# Patient Record
Sex: FEMALE | Race: WHITE | NOT HISPANIC OR LATINO | Employment: OTHER | URBAN - METROPOLITAN AREA
[De-identification: names, ages, dates, MRNs, and addresses within clinical notes are randomized per-mention and may not be internally consistent; named-entity substitution may affect disease eponyms.]

---

## 2023-02-20 ENCOUNTER — HOSPITAL ENCOUNTER (EMERGENCY)
Facility: HOSPITAL | Age: 83
Discharge: HOME | End: 2023-02-20
Attending: EMERGENCY MEDICINE | Admitting: EMERGENCY MEDICINE
Payer: MEDICARE

## 2023-02-20 ENCOUNTER — APPOINTMENT (OUTPATIENT)
Dept: CT IMAGING | Facility: HOSPITAL | Age: 83
End: 2023-02-20
Payer: MEDICARE

## 2023-02-20 VITALS
TEMPERATURE: 99 F | BODY MASS INDEX: 31.65 KG/M2 | HEIGHT: 65 IN | DIASTOLIC BLOOD PRESSURE: 80 MMHG | OXYGEN SATURATION: 98 % | RESPIRATION RATE: 16 BRPM | HEART RATE: 61 BPM | SYSTOLIC BLOOD PRESSURE: 145 MMHG | WEIGHT: 190 LBS

## 2023-02-20 DIAGNOSIS — N39.0 ACUTE URINARY TRACT INFECTION: ICD-10-CM

## 2023-02-20 DIAGNOSIS — K57.32 DIVERTICULITIS OF SIGMOID COLON: Primary | ICD-10-CM

## 2023-02-20 LAB
ALBUMIN SERPL-MCNC: 3.9 G/DL (ref 3.5–5.2)
ALBUMIN/GLOB SERPL: 1 G/DL
ALP SERPL-CCNC: 97 U/L (ref 39–117)
ALT SERPL W P-5'-P-CCNC: 8 U/L (ref 1–33)
ANION GAP SERPL CALCULATED.3IONS-SCNC: 9 MMOL/L (ref 5–15)
AST SERPL-CCNC: 14 U/L (ref 1–32)
BACTERIA UR QL AUTO: ABNORMAL /HPF
BASOPHILS # BLD AUTO: 0.04 10*3/MM3 (ref 0–0.2)
BASOPHILS NFR BLD AUTO: 0.6 % (ref 0–1.5)
BILIRUB SERPL-MCNC: 1.2 MG/DL (ref 0–1.2)
BILIRUB UR QL STRIP: NEGATIVE
BUN SERPL-MCNC: 15 MG/DL (ref 8–23)
BUN/CREAT SERPL: 14.4 (ref 7–25)
CALCIUM SPEC-SCNC: 10.1 MG/DL (ref 8.6–10.5)
CHLORIDE SERPL-SCNC: 102 MMOL/L (ref 98–107)
CLARITY UR: ABNORMAL
CO2 SERPL-SCNC: 26 MMOL/L (ref 22–29)
COLOR UR: YELLOW
CREAT SERPL-MCNC: 1.04 MG/DL (ref 0.57–1)
D-LACTATE SERPL-SCNC: 1.1 MMOL/L (ref 0.5–2)
DEPRECATED RDW RBC AUTO: 44.6 FL (ref 37–54)
EGFRCR SERPLBLD CKD-EPI 2021: 53.1 ML/MIN/1.73
EOSINOPHIL # BLD AUTO: 0.14 10*3/MM3 (ref 0–0.4)
EOSINOPHIL NFR BLD AUTO: 2.1 % (ref 0.3–6.2)
ERYTHROCYTE [DISTWIDTH] IN BLOOD BY AUTOMATED COUNT: 14.5 % (ref 12.3–15.4)
GLOBULIN UR ELPH-MCNC: 3.8 GM/DL
GLUCOSE SERPL-MCNC: 81 MG/DL (ref 65–99)
GLUCOSE UR STRIP-MCNC: NEGATIVE MG/DL
HCT VFR BLD AUTO: 40.5 % (ref 34–46.6)
HGB BLD-MCNC: 12.7 G/DL (ref 12–15.9)
HGB UR QL STRIP.AUTO: NEGATIVE
HOLD SPECIMEN: NORMAL
HOLD SPECIMEN: NORMAL
HYALINE CASTS UR QL AUTO: ABNORMAL /LPF
IMM GRANULOCYTES # BLD AUTO: 0.03 10*3/MM3 (ref 0–0.05)
IMM GRANULOCYTES NFR BLD AUTO: 0.4 % (ref 0–0.5)
KETONES UR QL STRIP: ABNORMAL
LEUKOCYTE ESTERASE UR QL STRIP.AUTO: ABNORMAL
LIPASE SERPL-CCNC: 9 U/L (ref 13–60)
LYMPHOCYTES # BLD AUTO: 1.25 10*3/MM3 (ref 0.7–3.1)
LYMPHOCYTES NFR BLD AUTO: 18.6 % (ref 19.6–45.3)
MCH RBC QN AUTO: 26.6 PG (ref 26.6–33)
MCHC RBC AUTO-ENTMCNC: 31.4 G/DL (ref 31.5–35.7)
MCV RBC AUTO: 84.7 FL (ref 79–97)
MONOCYTES # BLD AUTO: 0.54 10*3/MM3 (ref 0.1–0.9)
MONOCYTES NFR BLD AUTO: 8 % (ref 5–12)
NEUTROPHILS NFR BLD AUTO: 4.72 10*3/MM3 (ref 1.7–7)
NEUTROPHILS NFR BLD AUTO: 70.3 % (ref 42.7–76)
NITRITE UR QL STRIP: NEGATIVE
NRBC BLD AUTO-RTO: 0 /100 WBC (ref 0–0.2)
PH UR STRIP.AUTO: 5.5 [PH] (ref 5–8)
PLATELET # BLD AUTO: 274 10*3/MM3 (ref 140–450)
PMV BLD AUTO: 10.3 FL (ref 6–12)
POTASSIUM SERPL-SCNC: 4.5 MMOL/L (ref 3.5–5.2)
PROT SERPL-MCNC: 7.7 G/DL (ref 6–8.5)
PROT UR QL STRIP: NEGATIVE
RBC # BLD AUTO: 4.78 10*6/MM3 (ref 3.77–5.28)
RBC # UR STRIP: ABNORMAL /HPF
REF LAB TEST METHOD: ABNORMAL
SODIUM SERPL-SCNC: 137 MMOL/L (ref 136–145)
SP GR UR STRIP: 1.02 (ref 1–1.03)
SQUAMOUS #/AREA URNS HPF: ABNORMAL /HPF
UROBILINOGEN UR QL STRIP: ABNORMAL
WBC # UR STRIP: ABNORMAL /HPF
WBC NRBC COR # BLD: 6.72 10*3/MM3 (ref 3.4–10.8)
WHOLE BLOOD HOLD COAG: NORMAL
WHOLE BLOOD HOLD SPECIMEN: NORMAL

## 2023-02-20 PROCEDURE — 85025 COMPLETE CBC W/AUTO DIFF WBC: CPT | Performed by: EMERGENCY MEDICINE

## 2023-02-20 PROCEDURE — 83690 ASSAY OF LIPASE: CPT | Performed by: EMERGENCY MEDICINE

## 2023-02-20 PROCEDURE — 36415 COLL VENOUS BLD VENIPUNCTURE: CPT | Performed by: EMERGENCY MEDICINE

## 2023-02-20 PROCEDURE — 25010000002 IOPAMIDOL 61 % SOLUTION: Performed by: EMERGENCY MEDICINE

## 2023-02-20 PROCEDURE — 74177 CT ABD & PELVIS W/CONTRAST: CPT

## 2023-02-20 PROCEDURE — 83605 ASSAY OF LACTIC ACID: CPT | Performed by: EMERGENCY MEDICINE

## 2023-02-20 PROCEDURE — 80053 COMPREHEN METABOLIC PANEL: CPT | Performed by: EMERGENCY MEDICINE

## 2023-02-20 PROCEDURE — 81001 URINALYSIS AUTO W/SCOPE: CPT | Performed by: EMERGENCY MEDICINE

## 2023-02-20 PROCEDURE — 99283 EMERGENCY DEPT VISIT LOW MDM: CPT

## 2023-02-20 RX ORDER — METRONIDAZOLE 500 MG/1
500 TABLET ORAL 3 TIMES DAILY
Qty: 21 TABLET | Refills: 0 | Status: SHIPPED | OUTPATIENT
Start: 2023-02-20 | End: 2023-04-06 | Stop reason: HOSPADM

## 2023-02-20 RX ORDER — SODIUM CHLORIDE 0.9 % (FLUSH) 0.9 %
10 SYRINGE (ML) INJECTION AS NEEDED
Status: DISCONTINUED | OUTPATIENT
Start: 2023-02-20 | End: 2023-02-20 | Stop reason: HOSPADM

## 2023-02-20 RX ORDER — AMOXICILLIN AND CLAVULANATE POTASSIUM 875; 125 MG/1; MG/1
1 TABLET, FILM COATED ORAL 2 TIMES DAILY
Qty: 14 TABLET | Refills: 0 | Status: SHIPPED | OUTPATIENT
Start: 2023-02-20 | End: 2023-04-06 | Stop reason: HOSPADM

## 2023-02-20 RX ORDER — ONDANSETRON 8 MG/1
8 TABLET, ORALLY DISINTEGRATING ORAL EVERY 8 HOURS PRN
Qty: 20 TABLET | Refills: 0 | Status: SHIPPED | OUTPATIENT
Start: 2023-02-20

## 2023-02-20 RX ADMIN — IOPAMIDOL 85 ML: 612 INJECTION, SOLUTION INTRAVENOUS at 15:54

## 2023-02-20 NOTE — DISCHARGE INSTRUCTIONS
Take antibiotics as prescribed.  Follow bland diet for the next week (see discussion below).  Return to the emergency department for increased pain, fevers or chills, nausea, vomiting, diarrhea or blood in your stool.

## 2023-02-20 NOTE — ED PROVIDER NOTES
EMERGENCY DEPARTMENT ENCOUNTER    Room Number:  26/26  Date seen:  2/20/2023  PCP: Megha Carter APRN  Historian: Patient and son      HPI:  Chief Complaint: Abdominal pain    A complete HPI/ROS/PMH/PSH/SH/FH are unobtainable due to: She is hard of hearing and is a vague historian    Context: Destiny Hawkins is a 82 y.o. female who presents to the ED c/o lower abdominal pain that her son reports has been going on for approximately a week.  It is been waxing and waning.  No vomiting or diarrhea.  She has had some nausea.  No black or bloody stools.  No fevers or chills.  No dysuria.  She has a prior history of an umbilical hernia and evidently inguinal hernias but these have never been repaired.  She has remote history of a cholecystectomy, appendectomy and hysterectomy.        Additional sources:  - Discussed/ obtained information from independent historians: Yes-son confirms pertinent aspects of the HPI.    - External (non-ED) record review: She has no prior records available in epic.    - Chronic or social conditions impacting care: Advanced age, hard of hearing, memory problems per her son's report.    PAST MEDICAL HISTORY  Active Ambulatory Problems     Diagnosis Date Noted   • No Active Ambulatory Problems     Resolved Ambulatory Problems     Diagnosis Date Noted   • No Resolved Ambulatory Problems     No Additional Past Medical History         PAST SURGICAL HISTORY  No past surgical history on file.      FAMILY HISTORY  No family history on file.      SOCIAL HISTORY  Social History     Socioeconomic History   • Marital status:          ALLERGIES  Levaquin [levofloxacin] and Sulfa antibiotics        REVIEW OF SYSTEMS  Review of Systems   Constitutional: Negative for chills and fever.   Respiratory: Negative for shortness of breath.    Cardiovascular: Positive for leg swelling. Negative for chest pain.   Gastrointestinal: Positive for abdominal pain and nausea. Negative for blood in stool, diarrhea and  vomiting.   Genitourinary: Negative for difficulty urinating and dysuria.            PHYSICAL EXAM  ED Triage Vitals   Temp Heart Rate Resp BP SpO2   02/20/23 1201 02/20/23 1201 02/20/23 1211 02/20/23 1213 02/20/23 1201   99 °F (37.2 °C) 65 16 128/64 92 %      Temp src Heart Rate Source Patient Position BP Location FiO2 (%)   -- -- -- -- --              Physical Exam      Physical Exam   Constitutional: Pt. is awake and alert.  She appears oriented.  She is frail and heavily but is in no  HENT: Normocephalic and atraumatic. Pupils are equal, round, and reactive to light.   Neck: Normal range of motion. Neck supple. No JVD present. No tracheal deviation present.   Cardiovascular: Normal rate, regular rhythm and normal heart sounds.   Pulmonary/Chest: Effort normal and breath sounds normal. No stridor. No respiratory distress. No wheezes, no rales.   Abdominal: Soft.  No distension. There is mild periumbilical tenderness with an easily reducible umbilical hernia.  Left lower quadrant has minimal tenderness to deep palpation. There is no rebound and no guarding.   Musculoskeletal: No edema, tenderness or deformity.   Neurological: She is awake alert and appears oriented.  No focal deficits noted.   Skin: Skin is warm and dry. Pt. is not diaphoretic.  Psychiatric: Mood, affect normal.  She is pleasant and cooperative.  Nursing note and vitals reviewed.            LAB RESULTS  Recent Results (from the past 24 hour(s))   Lavender Top    Collection Time: 02/20/23 12:38 PM   Result Value Ref Range    Extra Tube hold for add-on    Gold Top - SST    Collection Time: 02/20/23 12:38 PM   Result Value Ref Range    Extra Tube Hold for add-ons.    Light Blue Top    Collection Time: 02/20/23 12:38 PM   Result Value Ref Range    Extra Tube Hold for add-ons.    CBC Auto Differential    Collection Time: 02/20/23 12:38 PM    Specimen: Blood   Result Value Ref Range    WBC 6.72 3.40 - 10.80 10*3/mm3    RBC 4.78 3.77 - 5.28 10*6/mm3     Hemoglobin 12.7 12.0 - 15.9 g/dL    Hematocrit 40.5 34.0 - 46.6 %    MCV 84.7 79.0 - 97.0 fL    MCH 26.6 26.6 - 33.0 pg    MCHC 31.4 (L) 31.5 - 35.7 g/dL    RDW 14.5 12.3 - 15.4 %    RDW-SD 44.6 37.0 - 54.0 fl    MPV 10.3 6.0 - 12.0 fL    Platelets 274 140 - 450 10*3/mm3    Neutrophil % 70.3 42.7 - 76.0 %    Lymphocyte % 18.6 (L) 19.6 - 45.3 %    Monocyte % 8.0 5.0 - 12.0 %    Eosinophil % 2.1 0.3 - 6.2 %    Basophil % 0.6 0.0 - 1.5 %    Immature Grans % 0.4 0.0 - 0.5 %    Neutrophils, Absolute 4.72 1.70 - 7.00 10*3/mm3    Lymphocytes, Absolute 1.25 0.70 - 3.10 10*3/mm3    Monocytes, Absolute 0.54 0.10 - 0.90 10*3/mm3    Eosinophils, Absolute 0.14 0.00 - 0.40 10*3/mm3    Basophils, Absolute 0.04 0.00 - 0.20 10*3/mm3    Immature Grans, Absolute 0.03 0.00 - 0.05 10*3/mm3    nRBC 0.0 0.0 - 0.2 /100 WBC   Urinalysis With Microscopic If Indicated (No Culture) - Urine, Clean Catch    Collection Time: 02/20/23  2:45 PM    Specimen: Urine, Clean Catch   Result Value Ref Range    Color, UA Yellow Yellow, Straw    Appearance, UA Cloudy (A) Clear    pH, UA 5.5 5.0 - 8.0    Specific Gravity, UA 1.018 1.005 - 1.030    Glucose, UA Negative Negative    Ketones, UA Trace (A) Negative    Bilirubin, UA Negative Negative    Blood, UA Negative Negative    Protein, UA Negative Negative    Leuk Esterase, UA Large (3+) (A) Negative    Nitrite, UA Negative Negative    Urobilinogen, UA 0.2 E.U./dL 0.2 - 1.0 E.U./dL   Urinalysis, Microscopic Only - Urine, Clean Catch    Collection Time: 02/20/23  2:45 PM    Specimen: Urine, Clean Catch   Result Value Ref Range    RBC, UA 0-2 None Seen, 0-2 /HPF    WBC, UA Too Numerous to Count (A) None Seen, 0-2 /HPF    Bacteria, UA 2+ (A) None Seen /HPF    Squamous Epithelial Cells, UA 7-12 (A) None Seen, 0-2 /HPF    Hyaline Casts, UA 3-6 None Seen /LPF    Methodology Automated Microscopy    Comprehensive Metabolic Panel    Collection Time: 02/20/23  3:02 PM    Specimen: Blood   Result Value Ref Range     Glucose 81 65 - 99 mg/dL    BUN 15 8 - 23 mg/dL    Creatinine 1.04 (H) 0.57 - 1.00 mg/dL    Sodium 137 136 - 145 mmol/L    Potassium 4.5 3.5 - 5.2 mmol/L    Chloride 102 98 - 107 mmol/L    CO2 26.0 22.0 - 29.0 mmol/L    Calcium 10.1 8.6 - 10.5 mg/dL    Total Protein 7.7 6.0 - 8.5 g/dL    Albumin 3.9 3.5 - 5.2 g/dL    ALT (SGPT) 8 1 - 33 U/L    AST (SGOT) 14 1 - 32 U/L    Alkaline Phosphatase 97 39 - 117 U/L    Total Bilirubin 1.2 0.0 - 1.2 mg/dL    Globulin 3.8 gm/dL    A/G Ratio 1.0 g/dL    BUN/Creatinine Ratio 14.4 7.0 - 25.0    Anion Gap 9.0 5.0 - 15.0 mmol/L    eGFR 53.1 (L) >60.0 mL/min/1.73   Lipase    Collection Time: 02/20/23  3:02 PM    Specimen: Blood   Result Value Ref Range    Lipase 9 (L) 13 - 60 U/L   Lactic Acid, Plasma    Collection Time: 02/20/23  3:02 PM    Specimen: Blood   Result Value Ref Range    Lactate 1.1 0.5 - 2.0 mmol/L   Green Top (Gel)    Collection Time: 02/20/23  3:02 PM   Result Value Ref Range    Extra Tube Hold for add-ons.        Ordered the above labs and reviewed the results.        RADIOLOGY  CT Abdomen Pelvis With Contrast    Result Date: 2/20/2023  CT ABDOMEN AND PELVIS WITH IV CONTRAST  HISTORY: Lower abdominal pain.  TECHNIQUE:  CT includes axial imaging from the lung bases to the trochanters with intravenous contrast and without use of oral contrast. Data reconstructed in coronal and sagittal planes. Radiation dose reduction techniques were utilized, including automated exposure control and exposure modulation based on body size.  COMPARISON: None.  FINDINGS: Within the left lower lobe extending above the left hemidiaphragm there is a nodular and linear opacity that measures approximately 2.7 x 1.5 cm in axial dimension and extends 3.5 cm in height. This partially exhibits low density suggesting loculated pleural fluid and is adjacent to the left lower lobe scar or atelectasis or infiltrate. Recommend followup to assure the absence of associated true nodule or neoplasm.  There is elevation of the left hemidiaphragm. There are no previous exams for comparison.  Liver, spleen, adrenal glands, and kidneys appear within normal limits. There has been previous cholecystectomy. The pancreas is partially fatty-replaced.  There is no evidence for acute pancreatic abnormality. There is no bowel dilatation or evidence for bowel obstruction.  There is abnormal stranding and inflammation surrounding a segment of the proximal sigmoid colon. Multiple colonic diverticula are present, most numerous in the sigmoid colon and the combination of findings is consistent with acute diverticulitis involving the proximal sigmoid colon. There is no evidence for diverticular abscess or drainable collection. There is a periumbilical hernia that contains a loop of small bowel without evidence for incarceration. There is exaggeration of the lumbar lordotic curvature and there is advanced multilevel degenerative disc disease within the lumbar spine. Chronic bilateral L5 pars defects are present and there is grade 1 spondylolisthesis of L5 with respect to S1.      1. Acute diverticulitis involving the proximal sigmoid colon. No evidence for peridiverticular abscess or drainable collection. Recommend CT or endoscopy follow-up as there can be overlap in the imaging appearance of colon neoplasm and wall thickening associated with diverticulitis. 2. Linear, somewhat nodular opacity in the left lung base appears to represent pleural fluid adjacent to an area of scar or atelectasis.  A true lung nodule or lung infiltrate are also in the differential diagnosis and recommend 2-3 week follow-up with chest CT. 3. Periumbilical hernia contains small bowel without evidence for incarceration or obstruction. 4. Previous cholecystectomy. 5. Advanced degenerative disease in the lumbar spine. Chronic bilateral L5 pars defects with grade 1 spondylolisthesis of L5 with respect to S1.    Radiation dose reduction techniques were  utilized, including automated exposure control and exposure modulation based on body size.  This report was finalized on 2/20/2023 4:35 PM by Dr. Kavon Cody M.D.        Ordered the above noted radiological studies. Reviewed by me in PACS.        PROCEDURES  Procedures      MEDICATIONS GIVEN IN ER  Medications   sodium chloride 0.9 % flush 10 mL (has no administration in time range)   iopamidol (ISOVUE-300) 61 % injection 100 mL (85 mL Intravenous Given by Other 2/20/23 6299)             MEDICAL DECISION MAKING, PROGRESS, and CONSULTS    All labs have been independently reviewed by me.  All radiology studies have been reviewed by me and discussed with radiologist dictating the report.   EKG's independently viewed and interpreted by me.  Discussion below represents my analysis of pertinent findings related to patient's condition, differential diagnosis, treatment plan and final disposition.      Orders placed during this visit:  Orders Placed This Encounter   Procedures   • CT Abdomen Pelvis With Contrast   • [place] Draw   • Comprehensive Metabolic Panel   • Lipase   • Urinalysis With Microscopic If Indicated (No Culture) - Urine, Clean Catch   • Lactic Acid, Plasma   • CBC Auto Differential   • Urinalysis, Microscopic Only - Urine, Clean Catch   • NPO Diet NPO Type: Strict NPO   • Undress & Gown   • Insert Peripheral IV   • CBC & Differential   • Green Top (Gel)   • Lavender Top   • Gold Top - SST   • Light Blue Top       Additional orders considered but not ordered:  N/A    Differential diagnosis:  DDx includes but is not limited to: Cholecystitis, choledocholithiasis, cholangitis, peritonitis, pancreatitis/pseudocyst, peptic ulcer, bowel obstruction/ileus, constipation, diverticulitis/perforation/abscess, inflammatory bowel disease, renal colic/stone, urinary tract infection/pyelonephritis, prostatitis, mesenteric ischemia, expanding/leaking AAA, testicular/ovarian torsion.      Independent interpretation of  labs, radiology studies, and discussions with consultants:  ED Course as of 02/20/23 1726   Mon Feb 20, 2023   1440 CBC is unremarkable. [WC]   1525 WBC, UA(!): Too Numerous to Count [WC]   1525 Bacteria, UA(!): 2+ [WC]   1525 Squamous Epithelial Cells, UA(!): 7-12 [WC]   1526 Leukocytes, UA(!): Large (3+) [WC]   1538 Lipase(!): 9 [WC]   1557 Lactate: 1.1 [WC]   1705 CT abdomen and pelvis was independently interpreted by me-I see no bowel obstruction or perforation.  Official interpretation was performed by Dr. Cody (radiology)-there is acute diverticulitis involving the proximal sigmoid colon.  There is no abscess or drainable fluid collection.  There is also a periumbilical hernia without evidence of incarceration or obstruction. [WC]   1722 I discussed the CT and lab results with the patient and her daughter-in-law.  Advised that she will be prescribed antibiotics for 7 days and that she will need to return to the emergency department for any problems.  Her white count is normal and her abdominal exam is benign-she is safe for outpatient treatment. [WC]      ED Course User Index  [WC] Presley Francisco MD              Appropriate PPE was worn by myself and the patient throughout our entire interaction.    DIAGNOSIS  Final diagnoses:   Diverticulitis of sigmoid colon   Acute urinary tract infection         DISPOSITION  Discharged            Latest Documented Vital Signs:  As of 17:26 EST  BP- 145/82 HR- 57 Temp- 99 °F (37.2 °C) O2 sat- 97%        --    Please note that portions of this were completed with a voice recognition program.       Note Disclaimer: At [facility], we believe that sharing information builds trust and better relationships. You are receiving this note because you are receiving care at [facility] or recently visited. It is possible you will see health information before a provider has talked with you about it. This kind of information can be easy to misunderstand. To help you fully  understand what it means for your health, we urge you to discuss this note with your provider.           Presley Francisco MD  02/20/23 1130

## 2023-02-20 NOTE — ED TRIAGE NOTES
Patient reports stomach pains for a week now, she was feeling better this weekend then got sick again with nausea and stomach pains. Patient has on mask nurses has on proper ppe

## 2023-04-04 ENCOUNTER — HOSPITAL ENCOUNTER (OUTPATIENT)
Facility: HOSPITAL | Age: 83
Setting detail: OBSERVATION
Discharge: HOME | End: 2023-04-06
Attending: EMERGENCY MEDICINE | Admitting: HOSPITALIST
Payer: COMMERCIAL

## 2023-04-04 ENCOUNTER — APPOINTMENT (OUTPATIENT)
Dept: GENERAL RADIOLOGY | Facility: HOSPITAL | Age: 83
End: 2023-04-04
Payer: COMMERCIAL

## 2023-04-04 DIAGNOSIS — R00.1 BRADYCARDIA: ICD-10-CM

## 2023-04-04 DIAGNOSIS — R77.8 ELEVATED TROPONIN: ICD-10-CM

## 2023-04-04 DIAGNOSIS — D64.9 ANEMIA, UNSPECIFIED TYPE: ICD-10-CM

## 2023-04-04 DIAGNOSIS — N17.9 AKI (ACUTE KIDNEY INJURY): ICD-10-CM

## 2023-04-04 DIAGNOSIS — R55 SYNCOPE, UNSPECIFIED SYNCOPE TYPE: Primary | ICD-10-CM

## 2023-04-04 LAB
ALBUMIN SERPL-MCNC: 4 G/DL (ref 3.5–5.2)
ALBUMIN/GLOB SERPL: 1.3 G/DL
ALP SERPL-CCNC: 88 U/L (ref 39–117)
ALT SERPL W P-5'-P-CCNC: 14 U/L (ref 1–33)
ANION GAP SERPL CALCULATED.3IONS-SCNC: 11.2 MMOL/L (ref 5–15)
APTT PPP: 26 SECONDS (ref 22.7–35.4)
AST SERPL-CCNC: 23 U/L (ref 1–32)
BASOPHILS # BLD AUTO: 0.03 10*3/MM3 (ref 0–0.2)
BASOPHILS NFR BLD AUTO: 0.6 % (ref 0–1.5)
BILIRUB SERPL-MCNC: 0.8 MG/DL (ref 0–1.2)
BUN SERPL-MCNC: 28 MG/DL (ref 8–23)
BUN/CREAT SERPL: 21.5 (ref 7–25)
CALCIUM SPEC-SCNC: 10.2 MG/DL (ref 8.6–10.5)
CHLORIDE SERPL-SCNC: 104 MMOL/L (ref 98–107)
CO2 SERPL-SCNC: 26.8 MMOL/L (ref 22–29)
CREAT SERPL-MCNC: 1.3 MG/DL (ref 0.57–1)
DEPRECATED RDW RBC AUTO: 49.9 FL (ref 37–54)
EGFRCR SERPLBLD CKD-EPI 2021: 40.6 ML/MIN/1.73
EOSINOPHIL # BLD AUTO: 0.12 10*3/MM3 (ref 0–0.4)
EOSINOPHIL NFR BLD AUTO: 2.6 % (ref 0.3–6.2)
ERYTHROCYTE [DISTWIDTH] IN BLOOD BY AUTOMATED COUNT: 15.8 % (ref 12.3–15.4)
GEN 5 2HR TROPONIN T REFLEX: 25 NG/L
GLOBULIN UR ELPH-MCNC: 3.2 GM/DL
GLUCOSE SERPL-MCNC: 99 MG/DL (ref 65–99)
HCT VFR BLD AUTO: 37.2 % (ref 34–46.6)
HGB BLD-MCNC: 11.4 G/DL (ref 12–15.9)
IMM GRANULOCYTES # BLD AUTO: 0.02 10*3/MM3 (ref 0–0.05)
IMM GRANULOCYTES NFR BLD AUTO: 0.4 % (ref 0–0.5)
INR PPP: 0.96 (ref 0.9–1.1)
LYMPHOCYTES # BLD AUTO: 0.85 10*3/MM3 (ref 0.7–3.1)
LYMPHOCYTES NFR BLD AUTO: 18.2 % (ref 19.6–45.3)
MAGNESIUM SERPL-MCNC: 2 MG/DL (ref 1.6–2.4)
MCH RBC QN AUTO: 26.5 PG (ref 26.6–33)
MCHC RBC AUTO-ENTMCNC: 30.6 G/DL (ref 31.5–35.7)
MCV RBC AUTO: 86.3 FL (ref 79–97)
MONOCYTES # BLD AUTO: 0.4 10*3/MM3 (ref 0.1–0.9)
MONOCYTES NFR BLD AUTO: 8.5 % (ref 5–12)
NEUTROPHILS NFR BLD AUTO: 3.26 10*3/MM3 (ref 1.7–7)
NEUTROPHILS NFR BLD AUTO: 69.7 % (ref 42.7–76)
NRBC BLD AUTO-RTO: 0 /100 WBC (ref 0–0.2)
NT-PROBNP SERPL-MCNC: 312 PG/ML (ref 0–1800)
PLATELET # BLD AUTO: 215 10*3/MM3 (ref 140–450)
PMV BLD AUTO: 9.9 FL (ref 6–12)
POTASSIUM SERPL-SCNC: 4.8 MMOL/L (ref 3.5–5.2)
PROT SERPL-MCNC: 7.2 G/DL (ref 6–8.5)
PROTHROMBIN TIME: 12.8 SECONDS (ref 11.7–14.2)
QT INTERVAL: 500 MS
RBC # BLD AUTO: 4.31 10*6/MM3 (ref 3.77–5.28)
SODIUM SERPL-SCNC: 142 MMOL/L (ref 136–145)
TROPONIN T DELTA: 8 NG/L
TROPONIN T SERPL HS-MCNC: 17 NG/L
WBC NRBC COR # BLD: 4.68 10*3/MM3 (ref 3.4–10.8)

## 2023-04-04 PROCEDURE — 25010000002 ONDANSETRON PER 1 MG: Performed by: EMERGENCY MEDICINE

## 2023-04-04 PROCEDURE — 85610 PROTHROMBIN TIME: CPT | Performed by: EMERGENCY MEDICINE

## 2023-04-04 PROCEDURE — 96361 HYDRATE IV INFUSION ADD-ON: CPT

## 2023-04-04 PROCEDURE — 83880 ASSAY OF NATRIURETIC PEPTIDE: CPT | Performed by: EMERGENCY MEDICINE

## 2023-04-04 PROCEDURE — 93005 ELECTROCARDIOGRAM TRACING: CPT | Performed by: EMERGENCY MEDICINE

## 2023-04-04 PROCEDURE — 85730 THROMBOPLASTIN TIME PARTIAL: CPT | Performed by: EMERGENCY MEDICINE

## 2023-04-04 PROCEDURE — 85025 COMPLETE CBC W/AUTO DIFF WBC: CPT | Performed by: EMERGENCY MEDICINE

## 2023-04-04 PROCEDURE — G0378 HOSPITAL OBSERVATION PER HR: HCPCS

## 2023-04-04 PROCEDURE — 84484 ASSAY OF TROPONIN QUANT: CPT | Performed by: EMERGENCY MEDICINE

## 2023-04-04 PROCEDURE — 93010 ELECTROCARDIOGRAM REPORT: CPT | Performed by: STUDENT IN AN ORGANIZED HEALTH CARE EDUCATION/TRAINING PROGRAM

## 2023-04-04 PROCEDURE — 36415 COLL VENOUS BLD VENIPUNCTURE: CPT

## 2023-04-04 PROCEDURE — 83735 ASSAY OF MAGNESIUM: CPT | Performed by: EMERGENCY MEDICINE

## 2023-04-04 PROCEDURE — 96374 THER/PROPH/DIAG INJ IV PUSH: CPT

## 2023-04-04 PROCEDURE — 71045 X-RAY EXAM CHEST 1 VIEW: CPT

## 2023-04-04 PROCEDURE — 99285 EMERGENCY DEPT VISIT HI MDM: CPT

## 2023-04-04 PROCEDURE — 80053 COMPREHEN METABOLIC PANEL: CPT | Performed by: EMERGENCY MEDICINE

## 2023-04-04 RX ORDER — KETOROLAC TROMETHAMINE 5 MG/ML
1 SOLUTION OPHTHALMIC 4 TIMES DAILY
COMMUNITY
Start: 2023-04-02

## 2023-04-04 RX ORDER — PROMETHAZINE HYDROCHLORIDE 25 MG/1
25 TABLET ORAL EVERY 6 HOURS PRN
COMMUNITY
End: 2023-04-06 | Stop reason: HOSPADM

## 2023-04-04 RX ORDER — FLUTICASONE PROPIONATE AND SALMETEROL 250; 50 UG/1; UG/1
1 POWDER RESPIRATORY (INHALATION)
COMMUNITY
End: 2023-05-02

## 2023-04-04 RX ORDER — PREDNISOLONE ACETATE 10 MG/ML
1 SUSPENSION/ DROPS OPHTHALMIC 3 TIMES DAILY
COMMUNITY
End: 2023-05-02 | Stop reason: SDUPTHER

## 2023-04-04 RX ORDER — FUROSEMIDE 40 MG/1
40 TABLET ORAL DAILY
Status: ON HOLD | COMMUNITY
End: 2023-04-06 | Stop reason: SDUPTHER

## 2023-04-04 RX ORDER — MECLIZINE HCL 12.5 MG/1
12.5 TABLET ORAL 3 TIMES DAILY PRN
COMMUNITY

## 2023-04-04 RX ORDER — PREDNISOLONE ACETATE 10 MG/ML
1 SUSPENSION/ DROPS OPHTHALMIC 2 TIMES DAILY
COMMUNITY
Start: 2023-02-21 | End: 2023-05-02 | Stop reason: SDUPTHER

## 2023-04-04 RX ORDER — ALBUTEROL SULFATE 90 UG/1
2 AEROSOL, METERED RESPIRATORY (INHALATION) EVERY 4 HOURS PRN
COMMUNITY

## 2023-04-04 RX ORDER — ONDANSETRON 2 MG/ML
8 INJECTION INTRAMUSCULAR; INTRAVENOUS ONCE
Status: COMPLETED | OUTPATIENT
Start: 2023-04-04 | End: 2023-04-04

## 2023-04-04 RX ORDER — ONDANSETRON 4 MG/1
4 TABLET, FILM COATED ORAL EVERY 8 HOURS PRN
COMMUNITY
End: 2023-04-06 | Stop reason: HOSPADM

## 2023-04-04 RX ORDER — ACETAMINOPHEN 325 MG/1
650 TABLET ORAL EVERY 4 HOURS PRN
Status: DISCONTINUED | OUTPATIENT
Start: 2023-04-04 | End: 2023-04-06 | Stop reason: HOSPADM

## 2023-04-04 RX ORDER — ONDANSETRON 2 MG/ML
4 INJECTION INTRAMUSCULAR; INTRAVENOUS EVERY 6 HOURS PRN
Status: DISCONTINUED | OUTPATIENT
Start: 2023-04-04 | End: 2023-04-06 | Stop reason: HOSPADM

## 2023-04-04 RX ORDER — ONDANSETRON 4 MG/1
4 TABLET, FILM COATED ORAL EVERY 6 HOURS PRN
Status: DISCONTINUED | OUTPATIENT
Start: 2023-04-04 | End: 2023-04-06 | Stop reason: HOSPADM

## 2023-04-04 RX ORDER — UREA 10 %
3 LOTION (ML) TOPICAL NIGHTLY PRN
Status: DISCONTINUED | OUTPATIENT
Start: 2023-04-04 | End: 2023-04-06 | Stop reason: HOSPADM

## 2023-04-04 RX ADMIN — ONDANSETRON 8 MG: 2 INJECTION INTRAMUSCULAR; INTRAVENOUS at 14:36

## 2023-04-04 RX ADMIN — SODIUM CHLORIDE 500 ML: 9 INJECTION, SOLUTION INTRAVENOUS at 14:36

## 2023-04-04 NOTE — ED NOTES
Nursing report ED to floor  Destiny Hawkins  82 y.o.  female    HPI :   Chief Complaint   Patient presents with    Syncope       Admitting doctor:   Anne-Marie Alejandro MD    Admitting diagnosis:   The primary encounter diagnosis was Syncope, unspecified syncope type. Diagnoses of Bradycardia, JUJU (acute kidney injury), Elevated troponin, and Anemia, unspecified type were also pertinent to this visit.    Code status:   Current Code Status       Date Active Code Status Order ID Comments User Context       4/4/2023 1837 CPR (Attempt to Resuscitate) [id number]  Anne-Marie Alejandro MD ED        Question Answer    Code Status (Patient has no pulse and is not breathing) CPR (Attempt to Resuscitate)    Medical Interventions (Patient has pulse or is breathing) Full                    Allergies:   Cephalexin, Hydrocodone, Levaquin [levofloxacin], Penicillins, Sulfa antibiotics, and Tetanus toxoid    Intake and Output  No intake or output data in the 24 hours ending 04/04/23 1901    Weight:   There were no vitals filed for this visit.    Most recent vitals:   Vitals:    04/04/23 1734 04/04/23 1751 04/04/23 1752 04/04/23 1753   BP:       Pulse: 50 50 50 52   Resp:       Temp:       SpO2: 100% 100% 99% 99%       Active LDAs/IV Access:   Lines, Drains & Airways       Active LDAs       Name Placement date Placement time Site Days    Peripheral IV 04/04/23 1224 Right Antecubital 04/04/23  1224  Antecubital  less than 1                    Labs (abnormal labs have a star):   Labs Reviewed   COMPREHENSIVE METABOLIC PANEL - Abnormal; Notable for the following components:       Result Value    BUN 28 (*)     Creatinine 1.30 (*)     eGFR 40.6 (*)     All other components within normal limits    Narrative:     GFR Normal >60  Chronic Kidney Disease <60  Kidney Failure <15    The GFR formula is only valid for adults with stable renal function between ages 18 and 70.   TROPONIN - Abnormal; Notable for the following components:    HS  Troponin T 17 (*)     All other components within normal limits    Narrative:     High Sensitive Troponin T Reference Range:  <10.0 ng/L- Negative Female for AMI  <15.0 ng/L- Negative Male for AMI  >=10 - Abnormal Female indicating possible myocardial injury.  >=15 - Abnormal Male indicating possible myocardial injury.   Clinicians would have to utilize clinical acumen, EKG, Troponin, and serial changes to determine if it is an Acute Myocardial Infarction or myocardial injury due to an underlying chronic condition.        CBC WITH AUTO DIFFERENTIAL - Abnormal; Notable for the following components:    Hemoglobin 11.4 (*)     MCH 26.5 (*)     MCHC 30.6 (*)     RDW 15.8 (*)     Lymphocyte % 18.2 (*)     All other components within normal limits   HIGH SENSITIVITIY TROPONIN T 2HR - Abnormal; Notable for the following components:    HS Troponin T 25 (*)     Troponin T Delta 8 (*)     All other components within normal limits    Narrative:     High Sensitive Troponin T Reference Range:  <10.0 ng/L- Negative Female for AMI  <15.0 ng/L- Negative Male for AMI  >=10 - Abnormal Female indicating possible myocardial injury.  >=15 - Abnormal Male indicating possible myocardial injury.   Clinicians would have to utilize clinical acumen, EKG, Troponin, and serial changes to determine if it is an Acute Myocardial Infarction or myocardial injury due to an underlying chronic condition.        BNP (IN-HOUSE) - Normal    Narrative:     Among patients with dyspnea, NT-proBNP is highly sensitive for the detection of acute congestive heart failure. In addition NT-proBNP of <300 pg/ml effectively rules out acute congestive heart failure with 99% negative predictive value.    Results may be falsely decreased if patient taking Biotin.     MAGNESIUM - Normal   PROTIME-INR - Normal   APTT - Normal   CBC AND DIFFERENTIAL    Narrative:     The following orders were created for panel order CBC & Differential.  Procedure                                Abnormality         Status                     ---------                               -----------         ------                     CBC Auto Differential[[id number]]        Abnormal            Final result                 Please view results for these tests on the individual orders.       EKG:   ECG 12 Lead   Final Result   HEART RATE= 45  bpm   RR Interval= 1333  ms   MA Interval= 208  ms   P Horizontal Axis= -37  deg   P Front Axis= 114  deg   QRSD Interval= 168  ms   QT Interval= 500  ms   QRS Axis= 47  deg   T Wave Axis= 29  deg   - ABNORMAL ECG -   Sinus bradycardia   Right bundle branch block   Sinus pause   No Prior Tracing for Comparison   Electronically Signed By: Shahbaz Martel ([facility]) 04-Apr-2023 14:23:16   Date and Time of Study: 2023-04-04 13:43:21      ECG 12 Lead Syncope    (Results Pending)       Meds given in ED:   Medications   acetaminophen (TYLENOL) tablet 650 mg (has no administration in time range)   ondansetron (ZOFRAN) tablet 4 mg (has no administration in time range)     Or   ondansetron (ZOFRAN) injection 4 mg (has no administration in time range)   melatonin tablet 3 mg (has no administration in time range)   sodium chloride 0.9 % bolus 500 mL (500 mL Intravenous New Bag 4/4/23 1436)   ondansetron (ZOFRAN) injection 8 mg (8 mg Intravenous Given 4/4/23 1436)       Imaging results:  XR Chest 1 View    Result Date: 4/4/2023  No focal pulmonary consolidation. Cardiomegaly. Follow-up as clinical indications persist.  This report was finalized on 4/4/2023 1:22 PM by Dr. Coy Robert M.D.       Ambulatory status:   - bed rest    Social issues:   Social History     Socioeconomic History    Marital status:        NIH Stroke Scale:        Nursing report ED to floor:

## 2023-04-04 NOTE — ED NOTES
Pt to er via ems from [facility]. Pt had syncopal episode in wheelchair. Pt did not fall out out of wheelchair or hit head. Pt has hx of syncopal episodes.     Pt and RN wearing mask throughout encounter.

## 2023-04-04 NOTE — ED PROVIDER NOTES
EMERGENCY DEPARTMENT ENCOUNTER  I wore full protective equipment throughout this patient encounter including a N95 mask, eye shield, gown and gloves. Hand hygiene was performed before donning protective equipment and after removal when leaving the room.    Room Number:  30/30  Date of encounter:  4/4/2023  PCP: Megha Carter APRN  Patient Care Team:  Megha Carter APRN as PCP - General (Family Medicine)     HPI:  Context: Destiny Hawkins is a 82 y.o. female who presents to the ED c/o chief complaint of syncope.  Patient reports that she was and the dining room at the facility when she began to feel lightheaded.  Patient reports that she attempted to stand up and passed out.  Patient denies any fall or trauma occurred, denies any pain.  She denies any reports of seizure-like activity, no incontinence of bowel or bladder, no oral lingual lacerations.  Patient reports that before this occurred she began to feel nauseous when she was in her bedroom.  Patient reports that she normally feels nauseous before she passes out.  Patient reports that she took nausea medicine and went to the dining room to attempt to get some to eat as she is unsure when she had eaten before this.  Patient denies any emesis, no diarrhea, reports nausea began in her bedroom.  Patient reports her only complaint at present is nausea.  Patient reports that she has passed out several similar times in the past.  Patient denies any fever or systemic symptoms.  Per report, patient did not fall, no head injury occurred with syncope.    MEDICAL HISTORY REVIEW  Reviewed in EPIC    PAST MEDICAL HISTORY  Active Ambulatory Problems     Diagnosis Date Noted   • No Active Ambulatory Problems     Resolved Ambulatory Problems     Diagnosis Date Noted   • No Resolved Ambulatory Problems     No Additional Past Medical History       PAST SURGICAL HISTORY  No past surgical history on file.    FAMILY HISTORY  No family history on file.    SOCIAL  HISTORY  Social History     Socioeconomic History   • Marital status:        ALLERGIES  Cephalexin, Hydrocodone, Levaquin [levofloxacin], Penicillins, Sulfa antibiotics, and Tetanus toxoid    The patient's allergies have been reviewed    REVIEW OF SYSTEMS  All systems reviewed and negative except for those discussed in HPI.     PHYSICAL EXAM  I have reviewed the triage vital signs and nursing notes.  ED Triage Vitals [04/04/23 1222]   Temp Heart Rate Resp BP SpO2   97.8 °F (36.6 °C) 52 16 160/63 98 %      Temp src Heart Rate Source Patient Position BP Location FiO2 (%)   -- -- -- -- --       General: No acute distress.  HENT: NCAT, PERRL, Nares patent.  Eyes: no scleral icterus.  Neck: trachea midline, no ROM limitations.  CV: regular rhythm, regular rate.  Respiratory: normal effort, CTAB.  Abdomen: soft, nondistended, NTTP, no rebound tenderness, no guarding or rigidity.  Musculoskeletal: no deformity.  Neuro: Alert and oriented, no facial droop, speech clear, no dysarthria or aphasia, moves all extremities well, sensation intact light touch all extremities, no focal deficits  Skin: warm, dry.    LAB RESULTS  Recent Results (from the past 24 hour(s))   Comprehensive Metabolic Panel    Collection Time: 04/04/23  1:02 PM    Specimen: Blood   Result Value Ref Range    Glucose 99 65 - 99 mg/dL    BUN 28 (H) 8 - 23 mg/dL    Creatinine 1.30 (H) 0.57 - 1.00 mg/dL    Sodium 142 136 - 145 mmol/L    Potassium 4.8 3.5 - 5.2 mmol/L    Chloride 104 98 - 107 mmol/L    CO2 26.8 22.0 - 29.0 mmol/L    Calcium 10.2 8.6 - 10.5 mg/dL    Total Protein 7.2 6.0 - 8.5 g/dL    Albumin 4.0 3.5 - 5.2 g/dL    ALT (SGPT) 14 1 - 33 U/L    AST (SGOT) 23 1 - 32 U/L    Alkaline Phosphatase 88 39 - 117 U/L    Total Bilirubin 0.8 0.0 - 1.2 mg/dL    Globulin 3.2 gm/dL    A/G Ratio 1.3 g/dL    BUN/Creatinine Ratio 21.5 7.0 - 25.0    Anion Gap 11.2 5.0 - 15.0 mmol/L    eGFR 40.6 (L) >60.0 mL/min/1.73   High Sensitivity Troponin T    Collection  Time: 04/04/23  1:02 PM    Specimen: Blood   Result Value Ref Range    HS Troponin T 17 (H) <10 ng/L   BNP    Collection Time: 04/04/23  1:02 PM    Specimen: Blood   Result Value Ref Range    proBNP 312.0 0.0 - 1,800.0 pg/mL   Magnesium    Collection Time: 04/04/23  1:02 PM    Specimen: Blood   Result Value Ref Range    Magnesium 2.0 1.6 - 2.4 mg/dL   CBC Auto Differential    Collection Time: 04/04/23  1:02 PM    Specimen: Blood   Result Value Ref Range    WBC 4.68 3.40 - 10.80 10*3/mm3    RBC 4.31 3.77 - 5.28 10*6/mm3    Hemoglobin 11.4 (L) 12.0 - 15.9 g/dL    Hematocrit 37.2 34.0 - 46.6 %    MCV 86.3 79.0 - 97.0 fL    MCH 26.5 (L) 26.6 - 33.0 pg    MCHC 30.6 (L) 31.5 - 35.7 g/dL    RDW 15.8 (H) 12.3 - 15.4 %    RDW-SD 49.9 37.0 - 54.0 fl    MPV 9.9 6.0 - 12.0 fL    Platelets 215 140 - 450 10*3/mm3    Neutrophil % 69.7 42.7 - 76.0 %    Lymphocyte % 18.2 (L) 19.6 - 45.3 %    Monocyte % 8.5 5.0 - 12.0 %    Eosinophil % 2.6 0.3 - 6.2 %    Basophil % 0.6 0.0 - 1.5 %    Immature Grans % 0.4 0.0 - 0.5 %    Neutrophils, Absolute 3.26 1.70 - 7.00 10*3/mm3    Lymphocytes, Absolute 0.85 0.70 - 3.10 10*3/mm3    Monocytes, Absolute 0.40 0.10 - 0.90 10*3/mm3    Eosinophils, Absolute 0.12 0.00 - 0.40 10*3/mm3    Basophils, Absolute 0.03 0.00 - 0.20 10*3/mm3    Immature Grans, Absolute 0.02 0.00 - 0.05 10*3/mm3    nRBC 0.0 0.0 - 0.2 /100 WBC   ECG 12 Lead    Collection Time: 04/04/23  1:43 PM   Result Value Ref Range    QT Interval 500 ms   Protime-INR    Collection Time: 04/04/23  2:39 PM    Specimen: Blood   Result Value Ref Range    Protime 12.8 11.7 - 14.2 Seconds    INR 0.96 0.90 - 1.10   aPTT    Collection Time: 04/04/23  2:39 PM    Specimen: Blood   Result Value Ref Range    PTT 26.0 22.7 - 35.4 seconds   High Sensitivity Troponin T 2Hr    Collection Time: 04/04/23  5:02 PM    Specimen: Blood   Result Value Ref Range    HS Troponin T 25 (H) <10 ng/L    Troponin T Delta 8 (C) >=-4 - <+4 ng/L       I ordered the above labs  and reviewed the results.    RADIOLOGY  XR Chest 1 View    Result Date: 4/4/2023  XR CHEST 1 VW-  HISTORY: Female who is 82 years-old,  dizziness  TECHNIQUE: Frontal view of the chest  COMPARISON: None available  FINDINGS: The heart is enlarged. Aorta is calcified. Pulmonary vasculature is unremarkable. No focal pulmonary consolidation, pleural effusion, or pneumothorax. Prominent degenerative changes are seen at the left shoulder. No acute osseous process.      No focal pulmonary consolidation. Cardiomegaly. Follow-up as clinical indications persist.  This report was finalized on 4/4/2023 1:22 PM by Dr. Coy Robert M.D.        I ordered the above noted radiological studies. I reviewed the images and results. I agree with the radiologist interpretation.    PROCEDURES  Procedures    MEDICATIONS GIVEN IN ER  Medications   sodium chloride 0.9 % bolus 500 mL (500 mL Intravenous New Bag 4/4/23 1436)   ondansetron (ZOFRAN) injection 8 mg (8 mg Intravenous Given 4/4/23 1436)       PROGRESS, DATA ANALYSIS, CONSULTS, AND MEDICAL DECISION MAKING  A complete history and physical exam have been performed.  All available laboratory and imaging results have been reviewed by myself prior to disposition.    MDM  After the initial H&P, I discussed pertinent information from history and physical exam with patient/family.  Discussed differential diagnosis.  Discussed plan for ED evaluation/workup/treatment.  All questions answered.  Patient/family is agreeable with plan.  ED Course as of 04/04/23 1831   Tue Apr 04, 2023   1228 My differential diagnosis for syncope includes but is not limited to:  Vasovagal reflex - situational stimulus, micturition, defecation, cough, sneezing, swallowing, postprandial state, react sinus hypersensitivity  Vascular-prolonged recumbency, sudden postural change, prolonged standing, hypovolemia, vasodilator drugs, autonomic neuropathy, adrenal insufficiency, subclavian steal, pulmonary  embolism  Cardiac -arrhythmia, heart block, myocardial infarction, aortic stenosis, cardiac myxoma, cardiac, LV Dysfunction, Aortic Dissection, Pulmonary Hypertension, Pulmonary Stenosis, Pacemaker Failure  CNS-seizure, hypoxia, hypoglycemia, TIA,(basal vertebral), hydrocephalus     [JG]   1305 I reviewed chest x-ray in PACS, my interpretation is cardiomegaly, no pulmonary infiltrates. [JG]   1351 EKG independently viewed and contemporaneously interpreted by ED physician. Time: 1343.  Rate 45.  Interpretation: Sinus bradycardia, normal axis, right bundle branch block, no ST elevation or depression, T wave inversion V1 and V2. [JG]   1351 No prior EKG for comparison. [JG]   1653 ED work-up unremarkable to present other than mild anemia just under normal, EKG showed no acute findings other than sinus bradycardia which patient does have a history of, high-sensitivity troponin minimally elevated.  Obtaining repeat to ensure is not uptrending.  Patient is noted to be minimally dehydrated with creatinine of 1.30, BUN 28, no significant electrolyte disturbances.  Patient receiving IV fluids. [JG]   1653 Patient reassessed.  Family now at bedside.  Family reports that patient has a history of syncope, patient has been having mild dizziness for the last 2 days.  Discussed ED work-up and results, discussed finding of mild dehydration, discussed patient receiving IV fluids with plan for repeat lab work but if lab work is reassuring, plan for discharge.  Patient family agreeable with plan, no questions or concerns. [JG]   1757 Repeat high-sensitivity troponin is slightly upgoing.  No clear etiology of upgoing troponin.  Patient has no chest pain or anginal equivalents at present, EKG showed no acute findings.  Plan for admission for further evaluation. [JG]   1753 Patient reassessed.  Discussed ED findings, differential diagnosis, and the need for admission for evaluation/treatment.  They are agreeable to admission and all  questions were answered.     [JG]   1829 Phone call with Dr. Alejandro [facility].  Discussed the patient, relevant history, exam, diagnostics, ED findings/progress, and concerns. They agree to admit the patient to telemetry observation. Care assumed by the admitting physician at this time.     [JG]      ED Course User Index  [JG] Jos Sandy MD       AS OF 18:31 EDT VITALS:    BP - (!) 185/96  HR - 52  TEMP - 97.8 °F (36.6 °C)  O2 SATS - 99%    DIAGNOSIS  Final diagnoses:   Syncope, unspecified syncope type   Bradycardia   JUJU (acute kidney injury)   Elevated troponin   Anemia, unspecified type         DISPOSITION  ADMISSION    Discussed treatment plan and reason for admission with pt/family and admitting physician.  Pt/family voiced understanding of the plan for admission for further testing/treatment as needed.          Jos Sandy MD  04/04/23 2395

## 2023-04-04 NOTE — PROGRESS NOTES
Clinical Pharmacy Services: Medication History    Destiny Hawkins is a 82 y.o. female presenting to [facility] for   Chief Complaint   Patient presents with   • Syncope       She  has no past medical history on file.    Allergies as of 04/04/2023 - Reviewed 04/04/2023   Allergen Reaction Noted   • Cephalexin Unknown - Low Severity 04/04/2023   • Hydrocodone Unknown - Low Severity 04/04/2023   • Levaquin [levofloxacin] Nausea And Vomiting 02/20/2023   • Penicillins Unknown - Low Severity 04/04/2023   • Sulfa antibiotics Nausea And Vomiting 02/20/2023   • Tetanus toxoid Unknown - Low Severity 09/09/2013       Medication information was obtained from: Patient's Son   Pharmacy and Phone Number:     Prior to Admission Medications     Prescriptions Last Dose Informant Patient Reported? Taking?    albuterol sulfate  (90 Base) MCG/ACT inhaler  Child Yes Yes    Inhale 2 puffs Every 4 (Four) Hours As Needed for Wheezing.    apixaban (ELIQUIS) 5 MG tablet tablet  Child, Medication Bottle Yes Yes    Take 1 tablet by mouth 2 (Two) Times a Day.    Fluticasone-Salmeterol (Advair Diskus) 250-50 MCG/ACT DISKUS  Child Yes Yes    Inhale 1 puff 2 (Two) Times a Day.    furosemide (LASIX) 40 MG tablet  Child, Medication Bottle Yes Yes    Take 1 tablet by mouth Daily.    ketorolac (ACULAR) 0.5 % ophthalmic solution  Child Yes Yes    Administer 1 drop to the right eye 4 (Four) Times a Day.    meclizine (ANTIVERT) 12.5 MG tablet  Child, Medication Bottle Yes Yes    Take 1 tablet by mouth 3 (Three) Times a Day As Needed for Dizziness.    ondansetron (ZOFRAN) 4 MG tablet  Child, Medication Bottle Yes Yes    Take 1 tablet by mouth Every 8 (Eight) Hours As Needed for Nausea or Vomiting.    prednisoLONE acetate (PRED FORTE) 1 % ophthalmic suspension  Child Yes Yes    Administer 1 drop into the left eye 2 (Two) Times a Day.    prednisoLONE acetate (PRED FORTE) 1 % ophthalmic suspension  Child Yes Yes    Administer 1 drop to the  right eye 3 (Three) Times a Day.    promethazine (PHENERGAN) 25 MG tablet  Child, Medication Bottle Yes Yes    Take 1 tablet by mouth Every 6 (Six) Hours As Needed for Nausea or Vomiting.    sertraline (ZOLOFT) 50 MG tablet  Child, Medication Bottle Yes Yes    Take 1 tablet by mouth Daily.    amoxicillin-clavulanate (AUGMENTIN) 875-125 MG per tablet   No No    Take 1 tablet by mouth 2 (Two) Times a Day.    metroNIDAZOLE (FLAGYL) 500 MG tablet   No No    Take 1 tablet by mouth 3 (Three) Times a Day.    ondansetron ODT (ZOFRAN-ODT) 8 MG disintegrating tablet   No No    Place 1 tablet under the tongue Every 8 (Eight) Hours As Needed for Nausea or Vomiting.            Medication notes:     This medication list is complete to the best of my knowledge as of 4/4/2023    Please call if questions.    Jay Ayala  Medication History Technician  [phone]    4/4/2023 13:24 EDT

## 2023-04-05 ENCOUNTER — APPOINTMENT (OUTPATIENT)
Dept: CARDIOLOGY | Facility: HOSPITAL | Age: 83
End: 2023-04-05
Payer: COMMERCIAL

## 2023-04-05 LAB
AORTIC DIMENSIONLESS INDEX: 0.5 (DI)
ASCENDING AORTA: 3 CM
BH CV ECHO MEAS - ACS: 1.69 CM
BH CV ECHO MEAS - AO MAX PG: 18.6 MMHG
BH CV ECHO MEAS - AO MEAN PG: 8.3 MMHG
BH CV ECHO MEAS - AO ROOT DIAM: 3.4 CM
BH CV ECHO MEAS - AO V2 MAX: 215.5 CM/SEC
BH CV ECHO MEAS - AO V2 VTI: 54.6 CM
BH CV ECHO MEAS - AVA(I,D): 2.03 CM2
BH CV ECHO MEAS - EDV(CUBED): 149.5 ML
BH CV ECHO MEAS - EDV(MOD-SP2): 64 ML
BH CV ECHO MEAS - EDV(MOD-SP4): 64 ML
BH CV ECHO MEAS - EF(MOD-BP): 74.1 %
BH CV ECHO MEAS - EF(MOD-SP2): 73.4 %
BH CV ECHO MEAS - EF(MOD-SP4): 73.4 %
BH CV ECHO MEAS - ESV(CUBED): 44.4 ML
BH CV ECHO MEAS - ESV(MOD-SP2): 17 ML
BH CV ECHO MEAS - ESV(MOD-SP4): 17 ML
BH CV ECHO MEAS - FS: 33.3 %
BH CV ECHO MEAS - IVS/LVPW: 1.02 CM
BH CV ECHO MEAS - IVSD: 1.09 CM
BH CV ECHO MEAS - LAT PEAK E' VEL: 7.4 CM/SEC
BH CV ECHO MEAS - LV MASS(C)D: 222.3 GRAMS
BH CV ECHO MEAS - LV MAX PG: 4.6 MMHG
BH CV ECHO MEAS - LV MEAN PG: 2.5 MMHG
BH CV ECHO MEAS - LV V1 MAX: 107.2 CM/SEC
BH CV ECHO MEAS - LV V1 VTI: 29.8 CM
BH CV ECHO MEAS - LVIDD: 5.3 CM
BH CV ECHO MEAS - LVIDS: 3.5 CM
BH CV ECHO MEAS - LVOT AREA: 3.7 CM2
BH CV ECHO MEAS - LVOT DIAM: 2.18 CM
BH CV ECHO MEAS - LVPWD: 1.07 CM
BH CV ECHO MEAS - MED PEAK E' VEL: 6.8 CM/SEC
BH CV ECHO MEAS - MV A DUR: 0.17 SEC
BH CV ECHO MEAS - MV A MAX VEL: 96.5 CM/SEC
BH CV ECHO MEAS - MV DEC SLOPE: 351 CM/SEC2
BH CV ECHO MEAS - MV DEC TIME: 0.3 MSEC
BH CV ECHO MEAS - MV E MAX VEL: 125 CM/SEC
BH CV ECHO MEAS - MV E/A: 1.29
BH CV ECHO MEAS - MV MAX PG: 5.2 MMHG
BH CV ECHO MEAS - MV MEAN PG: 1.77 MMHG
BH CV ECHO MEAS - MV P1/2T: 96.3 MSEC
BH CV ECHO MEAS - MV V2 VTI: 42.6 CM
BH CV ECHO MEAS - MVA(P1/2T): 2.29 CM2
BH CV ECHO MEAS - MVA(VTI): 2.6 CM2
BH CV ECHO MEAS - PA ACC TIME: 0.17 SEC
BH CV ECHO MEAS - PA PR(ACCEL): 4.1 MMHG
BH CV ECHO MEAS - PA V2 MAX: 118.1 CM/SEC
BH CV ECHO MEAS - PI END-D VEL: 81.4 CM/SEC
BH CV ECHO MEAS - PULM A REVS DUR: 0.15 SEC
BH CV ECHO MEAS - PULM A REVS VEL: 19.9 CM/SEC
BH CV ECHO MEAS - PULM DIAS VEL: 30.1 CM/SEC
BH CV ECHO MEAS - PULM S/D: 0.68
BH CV ECHO MEAS - PULM SYS VEL: 20.4 CM/SEC
BH CV ECHO MEAS - QP/QS: 0.74
BH CV ECHO MEAS - RAP SYSTOLE: 3 MMHG
BH CV ECHO MEAS - RV MAX PG: 3.7 MMHG
BH CV ECHO MEAS - RV V1 MAX: 95.6 CM/SEC
BH CV ECHO MEAS - RV V1 VTI: 27.2 CM
BH CV ECHO MEAS - RVOT DIAM: 1.95 CM
BH CV ECHO MEAS - RVSP: 21 MMHG
BH CV ECHO MEAS - SV(LVOT): 110.9 ML
BH CV ECHO MEAS - SV(MOD-SP2): 47 ML
BH CV ECHO MEAS - SV(MOD-SP4): 47 ML
BH CV ECHO MEAS - SV(RVOT): 81.6 ML
BH CV ECHO MEAS - TAPSE (>1.6): 2.6 CM
BH CV ECHO MEAS - TR MAX PG: 17 MMHG
BH CV ECHO MEAS - TR MAX VEL: 206.3 CM/SEC
BH CV ECHO MEASUREMENTS AVERAGE E/E' RATIO: 17.61
BH CV UPPER ARTERIAL LEFT 2ND DIGIT SYS MAX: 138
BH CV UPPER ARTERIAL LEFT FBI 2ND DIGIT RATIO: 0.98
BH CV UPPER ARTERIAL LEFT WBI RATIO: 1.55
BH CV UPPER ARTERIAL RIGHT 2ND DIGIT SYS MAX: 142
BH CV UPPER ARTERIAL RIGHT FBI 2ND DIGIT RATIO: 1.01
BH CV UPPER ARTERIAL RIGHT WBI RATIO: 1.07
BH CV XLRA - RV BASE: 2.9 CM
BH CV XLRA - RV MID: 1.61 CM
BH CV XLRA - TDI S': 11.5 CM/SEC
BH CV XLRA MEAS LEFT DIST CCA EDV: 21.1 CM/SEC
BH CV XLRA MEAS LEFT DIST CCA PSV: 84.5 CM/SEC
BH CV XLRA MEAS LEFT DIST ICA EDV: -20.8 CM/SEC
BH CV XLRA MEAS LEFT DIST ICA PSV: -106.6 CM/SEC
BH CV XLRA MEAS LEFT ICA/CCA RATIO: 1.82
BH CV XLRA MEAS LEFT MID ICA EDV: -34 CM/SEC
BH CV XLRA MEAS LEFT MID ICA PSV: -153.7 CM/SEC
BH CV XLRA MEAS LEFT PROX CCA EDV: 12.4 CM/SEC
BH CV XLRA MEAS LEFT PROX CCA PSV: 75.2 CM/SEC
BH CV XLRA MEAS LEFT PROX ECA EDV: -9.9 CM/SEC
BH CV XLRA MEAS LEFT PROX ECA PSV: -100 CM/SEC
BH CV XLRA MEAS LEFT PROX ICA EDV: 22.4 CM/SEC
BH CV XLRA MEAS LEFT PROX ICA PSV: 77.7 CM/SEC
BH CV XLRA MEAS LEFT PROX SCLA PSV: 181.7 CM/SEC
BH CV XLRA MEAS LEFT VERTEBRAL A EDV: 10.6 CM/SEC
BH CV XLRA MEAS LEFT VERTEBRAL A PSV: 50.3 CM/SEC
BH CV XLRA MEAS RIGHT DIST CCA EDV: 13.7 CM/SEC
BH CV XLRA MEAS RIGHT DIST CCA PSV: 79.5 CM/SEC
BH CV XLRA MEAS RIGHT DIST ICA EDV: -18 CM/SEC
BH CV XLRA MEAS RIGHT DIST ICA PSV: -82.6 CM/SEC
BH CV XLRA MEAS RIGHT ICA/CCA RATIO: 1.04
BH CV XLRA MEAS RIGHT MID ICA EDV: -13.8 CM/SEC
BH CV XLRA MEAS RIGHT MID ICA PSV: -78.1 CM/SEC
BH CV XLRA MEAS RIGHT PROX CCA EDV: 11.2 CM/SEC
BH CV XLRA MEAS RIGHT PROX CCA PSV: 85.1 CM/SEC
BH CV XLRA MEAS RIGHT PROX ECA EDV: -8.1 CM/SEC
BH CV XLRA MEAS RIGHT PROX ECA PSV: -113.7 CM/SEC
BH CV XLRA MEAS RIGHT PROX ICA EDV: -11.8 CM/SEC
BH CV XLRA MEAS RIGHT PROX ICA PSV: -59.5 CM/SEC
BH CV XLRA MEAS RIGHT PROX SCLA PSV: 164.6 CM/SEC
BH CV XLRA MEAS RIGHT VERTEBRAL A EDV: -7.3 CM/SEC
BH CV XLRA MEAS RIGHT VERTEBRAL A PSV: -29 CM/SEC
LEFT ARM BP: NORMAL MMHG
LEFT ATRIUM VOLUME INDEX: 35 ML/M2
MAXIMAL PREDICTED HEART RATE: 136 BPM
RIGHT ARM BP: NORMAL MMHG
SINUS: 3 CM
STJ: 2.4 CM
STRESS TARGET HR: 116 BPM
UPPER ARTERIAL LEFT ARM BRACHIAL SYS MAX: 140 MMHG
UPPER ARTERIAL LEFT ARM RADIAL SYS MAX: 153 MMHG
UPPER ARTERIAL LEFT ARM ULNAR SYS MAX: 217 MMHG
UPPER ARTERIAL RIGHT ARM BRACHIAL SYS MAX: 134 MMHG
UPPER ARTERIAL RIGHT ARM RADIAL SYS MAX: 136 MMHG
UPPER ARTERIAL RIGHT ARM ULNAR SYS MAX: 150 MMHG

## 2023-04-05 PROCEDURE — 99222 1ST HOSP IP/OBS MODERATE 55: CPT | Performed by: INTERNAL MEDICINE

## 2023-04-05 PROCEDURE — 94640 AIRWAY INHALATION TREATMENT: CPT

## 2023-04-05 PROCEDURE — G0378 HOSPITAL OBSERVATION PER HR: HCPCS

## 2023-04-05 PROCEDURE — 96376 TX/PRO/DX INJ SAME DRUG ADON: CPT

## 2023-04-05 PROCEDURE — 25010000002 ONDANSETRON PER 1 MG: Performed by: INTERNAL MEDICINE

## 2023-04-05 PROCEDURE — 93306 TTE W/DOPPLER COMPLETE: CPT | Performed by: INTERNAL MEDICINE

## 2023-04-05 PROCEDURE — 93923 UPR/LXTR ART STDY 3+ LVLS: CPT

## 2023-04-05 PROCEDURE — 93880 EXTRACRANIAL BILAT STUDY: CPT

## 2023-04-05 PROCEDURE — 93922 UPR/L XTREMITY ART 2 LEVELS: CPT

## 2023-04-05 PROCEDURE — 94761 N-INVAS EAR/PLS OXIMETRY MLT: CPT

## 2023-04-05 PROCEDURE — 93306 TTE W/DOPPLER COMPLETE: CPT

## 2023-04-05 PROCEDURE — 94799 UNLISTED PULMONARY SVC/PX: CPT

## 2023-04-05 RX ORDER — PREDNISOLONE ACETATE 10 MG/ML
1 SUSPENSION/ DROPS OPHTHALMIC EVERY 12 HOURS SCHEDULED
Status: DISCONTINUED | OUTPATIENT
Start: 2023-04-05 | End: 2023-04-06 | Stop reason: HOSPADM

## 2023-04-05 RX ORDER — ALBUTEROL SULFATE 2.5 MG/3ML
2.5 SOLUTION RESPIRATORY (INHALATION) EVERY 4 HOURS PRN
Status: DISCONTINUED | OUTPATIENT
Start: 2023-04-05 | End: 2023-04-06 | Stop reason: HOSPADM

## 2023-04-05 RX ORDER — PREDNISOLONE ACETATE 10 MG/ML
1 SUSPENSION/ DROPS OPHTHALMIC EVERY 12 HOURS SCHEDULED
Status: DISCONTINUED | OUTPATIENT
Start: 2023-04-05 | End: 2023-04-05

## 2023-04-05 RX ORDER — SODIUM CHLORIDE 9 MG/ML
75 INJECTION, SOLUTION INTRAVENOUS CONTINUOUS
Status: ACTIVE | OUTPATIENT
Start: 2023-04-05 | End: 2023-04-06

## 2023-04-05 RX ORDER — BUDESONIDE AND FORMOTEROL FUMARATE DIHYDRATE 160; 4.5 UG/1; UG/1
2 AEROSOL RESPIRATORY (INHALATION)
Status: DISCONTINUED | OUTPATIENT
Start: 2023-04-05 | End: 2023-04-06 | Stop reason: HOSPADM

## 2023-04-05 RX ORDER — PREDNISOLONE ACETATE 10 MG/ML
1 SUSPENSION/ DROPS OPHTHALMIC EVERY 8 HOURS SCHEDULED
Status: DISCONTINUED | OUTPATIENT
Start: 2023-04-05 | End: 2023-04-06 | Stop reason: HOSPADM

## 2023-04-05 RX ORDER — KETOROLAC TROMETHAMINE 5 MG/ML
1 SOLUTION OPHTHALMIC 4 TIMES DAILY
Status: DISCONTINUED | OUTPATIENT
Start: 2023-04-05 | End: 2023-04-06 | Stop reason: HOSPADM

## 2023-04-05 RX ADMIN — ONDANSETRON 4 MG: 2 INJECTION INTRAMUSCULAR; INTRAVENOUS at 10:24

## 2023-04-05 RX ADMIN — PREDNISOLONE ACETATE 1 DROP: 10 SUSPENSION/ DROPS OPHTHALMIC at 14:27

## 2023-04-05 RX ADMIN — KETOROLAC TROMETHAMINE 1 DROP: 0.5 SOLUTION OPHTHALMIC at 14:26

## 2023-04-05 RX ADMIN — SERTRALINE 50 MG: 50 TABLET, FILM COATED ORAL at 10:25

## 2023-04-05 RX ADMIN — KETOROLAC TROMETHAMINE 1 DROP: 0.5 SOLUTION OPHTHALMIC at 21:24

## 2023-04-05 RX ADMIN — APIXABAN 5 MG: 5 TABLET, FILM COATED ORAL at 08:19

## 2023-04-05 RX ADMIN — PREDNISOLONE ACETATE 1 DROP: 10 SUSPENSION/ DROPS OPHTHALMIC at 22:22

## 2023-04-05 RX ADMIN — BUDESONIDE AND FORMOTEROL FUMARATE DIHYDRATE 2 PUFF: 160; 4.5 AEROSOL RESPIRATORY (INHALATION) at 19:46

## 2023-04-05 RX ADMIN — APIXABAN 5 MG: 5 TABLET, FILM COATED ORAL at 21:24

## 2023-04-05 RX ADMIN — PREDNISOLONE ACETATE 1 DROP: 10 SUSPENSION/ DROPS OPHTHALMIC at 21:32

## 2023-04-05 RX ADMIN — Medication 3 MG: at 21:24

## 2023-04-05 RX ADMIN — KETOROLAC TROMETHAMINE 1 DROP: 0.5 SOLUTION OPHTHALMIC at 08:19

## 2023-04-05 RX ADMIN — KETOROLAC TROMETHAMINE 1 DROP: 0.5 SOLUTION OPHTHALMIC at 17:28

## 2023-04-05 NOTE — H&P
HISTORY AND PHYSICAL   [facility]        Date of Admission: 2023  Patient Identification:  Name: Destiny Hawkins  Age: 82 y.o.  Sex: female  :  1940  MRN: [id number]                     Primary Care Physician: Megha Carter APRN    Chief Complaint:  82 year old female who presented to the emergency room after she had a syncopal episode; she Iives in a senior living facility; she felt nauseated and thought she needed to get up; when she did she passed out; she thinks she had another syncopal episode earlier in the day when she regained consciousness lying on her bed but does not remember how she got there; she denies fever or chills; no chest pain    History of Present Illness:   As above    Past Medical History:   asthma  ckd3  Past Surgical History:  No past surgical history on file.   Home Meds:  (Not in a hospital admission)      Allergies:  Allergies   Allergen Reactions   • Cephalexin Unknown - Low Severity   • Hydrocodone Unknown - Low Severity   • Levaquin [Levofloxacin] Nausea And Vomiting   • Penicillins Unknown - Low Severity   • Sulfa Antibiotics Nausea And Vomiting   • Tetanus Toxoid Unknown - Low Severity     Immunizations:    There is no immunization history on file for this patient.  Social History:   Social History     Social History Narrative   • Not on file     Social History     Socioeconomic History   • Marital status:        Family History:  No family history on file.     Review of Systems  See history of present illness and past medical history.  Patient denies headache, dizziness,  alls, trauma, change in vision, change in hearing, change in taste, changes in weight, changes in appetite, focal weakness, numbness, or paresthesia.  Patient denies chest pain, palpitations, dyspnea, orthopnea, PND, cough, sinus pressure, rhinorrhea, epistaxis, hemoptysis, nausea, vomiting,hematemesis, diarrhea, constipation or hematchezia.  Denies cold or heat intolerance,  polydipsia, polyuria, polyphagia. Denies hematuria, pyuria, dysuria, hesitancy, frequency or urgency. Denies consumption of raw and under cooked meats foods or change in water source.  Denies fever, chills, sweats, night sweats.  Denies missing any routine medications. Remainder of ROS is negative.    Objective:  T Max 24 hrs: Temp (24hrs), Av.8 °F (36.6 °C), Min:97.8 °F (36.6 °C), Max:97.8 °F (36.6 °C)    Vitals Ranges:   Temp:  [97.8 °F (36.6 °C)] 97.8 °F (36.6 °C)  Heart Rate:  [50-67] 53  Resp:  [16] 16  BP: (141-185)/() 141/56      Exam:  /56   Pulse 53   Temp 97.8 °F (36.6 °C)   Resp 16   SpO2 100%     General Appearance:    Alert, cooperative, no distress, appears stated age   Head:    Normocephalic, without obvious abnormality, atraumatic   Eyes:    PERRL, conjunctivae/corneas clear, EOM's intact, both eyes   Ears:    Normal external ear canals, both ears   Nose:   Nares normal, septum midline, mucosa normal, no drainage    or sinus tenderness   Throat:   Lips, mucosa, and tongue normal   Neck:   Supple, symmetrical, trachea midline, no adenopathy;     thyroid:  no enlargement/tenderness/nodules; no carotid    bruit or JVD   Back:     Symmetric, no curvature, ROM normal, no CVA tenderness   Lungs:     Clear to auscultation bilaterally, respirations unlabored   Chest Wall:    No tenderness or deformity    Heart:    Regular rate and rhythm, S1 and S2 normal, no murmur, rub   or gallop   Abdomen:     Soft, nontender, bowel sounds active all four quadrants,     no masses, no hepatomegaly, no splenomegaly   Extremities:   Extremities normal, atraumatic, no cyanosis or edema   Pulses:   2+ and symmetric all extremities   Skin:   Skin color, texture, turgor normal, no rashes or lesions               .    Data Review:  Labs in chart were reviewed.  WBC   Date Value Ref Range Status   2023 4.68 3.40 - 10.80 10*3/mm3 Final     Hemoglobin   Date Value Ref Range Status   2023 11.4 (L)  12.0 - 15.9 g/dL Final     Hematocrit   Date Value Ref Range Status   04/04/2023 37.2 34.0 - 46.6 % Final     Platelets   Date Value Ref Range Status   04/04/2023 215 140 - 450 10*3/mm3 Final     Sodium   Date Value Ref Range Status   04/04/2023 142 136 - 145 mmol/L Final     Potassium   Date Value Ref Range Status   04/04/2023 4.8 3.5 - 5.2 mmol/L Final     Chloride   Date Value Ref Range Status   04/04/2023 104 98 - 107 mmol/L Final     CO2   Date Value Ref Range Status   04/04/2023 26.8 22.0 - 29.0 mmol/L Final     BUN   Date Value Ref Range Status   04/04/2023 28 (H) 8 - 23 mg/dL Final     Creatinine   Date Value Ref Range Status   04/04/2023 1.30 (H) 0.57 - 1.00 mg/dL Final     Glucose   Date Value Ref Range Status   04/04/2023 99 65 - 99 mg/dL Final     Calcium   Date Value Ref Range Status   04/04/2023 10.2 8.6 - 10.5 mg/dL Final     Magnesium   Date Value Ref Range Status   04/04/2023 2.0 1.6 - 2.4 mg/dL Final                Imaging Results (All)     Procedure Component Value Units Date/Time    XR Chest 1 View [[id number]] Collected: 04/04/23 1321     Updated: 04/04/23 1325    Narrative:      XR CHEST 1 VW-     HISTORY: Female who is 82 years-old,  dizziness     TECHNIQUE: Frontal view of the chest     COMPARISON: None available     FINDINGS: The heart is enlarged. Aorta is calcified. Pulmonary  vasculature is unremarkable. No focal pulmonary consolidation, pleural  effusion, or pneumothorax. Prominent degenerative changes are seen at  the left shoulder. No acute osseous process.       Impression:      No focal pulmonary consolidation. Cardiomegaly. Follow-up as  clinical indications persist.     This report was finalized on 4/4/2023 1:22 PM by Dr. Coy Robert M.D.               Assessment:  Active Hospital Problems    Diagnosis  POA   • **Syncope, unspecified syncope type [R55]  Yes      Resolved Hospital Problems   No resolved problems to display.   hypertension  Anemia  asthma    Plan:  Monitor on  telemetry  Ask cardiology to see her  Check echo  Check carotid doppler  Check upper extremity dopplers since she and family say there is a discrepancy between blood pressures in her arms  D.w patient and family as well as ED provider  Anne-Marie Alejandro MD  4/4/2023  21:46 EDT

## 2023-04-05 NOTE — PLAN OF CARE
Problem: Adult Inpatient Plan of Care  Goal: Plan of Care Review  Outcome: Ongoing, Not Progressing  Flowsheets (Taken 4/5/2023 1927)  Plan of Care Reviewed With: patient  Outcome Evaluation: Patient has hearing aids and is now wearing them which allowed her to hear staff and to communicate more efficiently. She has reported having constipation but had multiple bowel movements. Then reported having irritable bowel syndrome. She was upset with the incontinence issues both bowel and bladder which resulted in family bringing in personal pads and nurse gave patient mesh underwear. This allowed her to feel better and gave her more control that she needed. Cardiology is aware of the 2.5 sec pause this morning and scheduled Transthoracic Echo which is resulted. Family is supportive and caring of this patient and helpful to staf..  Goal: Readiness for Transition of Care  Outcome: Ongoing, Not Progressing   Goal Outcome Evaluation:  Plan of Care Reviewed With: patient           Outcome Evaluation: Patient has hearing aids and is now wearing them which allowed her to hear staff and to communicate more efficiently. She has reported having constipation but had multiple bowel movements. Then reported having irritable bowel syndrome. She was upset with the incontinence issues both bowel and bladder which resulted in family bringing in personal pads and nurse gave patient mesh underwear. This allowed her to feel better and gave her more control that she needed. Cardiology is aware of the 2.5 sec pause this morning and scheduled Transthoracic Echo which is resulted. Family is supportive and caring of this patient and helpful to staf..

## 2023-04-05 NOTE — PLAN OF CARE
Problem: Adult Inpatient Plan of Care  Goal: Plan of Care Review  Outcome: Ongoing, Progressing  Flowsheets (Taken 4/5/2023 0440)  Plan of Care Reviewed With: patient  Outcome Evaluation: Pt admitted from the ED c/o light headedness and having fell but not remembering how she fell. Pt refused to wear the purewick, stating it made her wet herself. When the process was explained to her she said it didn't work and removed it and soiled her brief and bed. The pt stated she needed to walk to the bathroom, when explained that she was at a high risk for falls she said that she wouldn't fall and got oob and proceeded to walk to the bathroom. She insisted on holding onto staff and stated that she wasn't going to wear the purewick again. Pt had urinated and had a BM, a brief was placed on the pt and was escorted back to bed. Son was at the bedside and reviewed meds and stated that his Mom wants things her way and that her way makes her happy. Her eye drops were taken to pharmacy for labeling, so that they may be used while she is a patient here. Pt slept between care and staff [facility].   Goal Outcome Evaluation:  Plan of Care Reviewed With: patient           Outcome Evaluation: Pt admitted from the ED c/o light headedness and having fell but not remembering how she fell. Pt refused to wear the purewick, stating it made her wet herself. When the process was explained to her she said it didn't work and removed it and soiled her brief and bed. The pt stated she needed to walk to the bathroom, when explained that she was at a high risk for falls she said that she wouldn't fall and got oob and proceeded to walk to the bathroom. She insisted on holding onto staff and stated that she wasn't going to wear the purewick again. Pt had urinated and had a BM, a brief was placed on the pt and was escorted back to bed. Son was at the bedside and reviewed meds and stated that his Mom wants things her way and that her way makes her happy. Her  eye drops were taken to pharmacy for labeling, so that they may be used while she is a patient here. Pt slept between care and staff [facility].

## 2023-04-05 NOTE — PROGRESS NOTES
"DAILY PROGRESS NOTE  [facility]    Patient Identification:  Name: Destiny Hawkins  Age: 82 y.o.  Sex: female  :  1940  MRN: [id number]         Primary Care Physician: Megha Carter APRN      Subjective  Patient presently has no complaints.    Objective:  General Appearance:  Comfortable, well-appearing, in no acute distress and not in pain.    Vital signs: (most recent): Blood pressure 139/61, pulse 50, temperature 97.5 °F (36.4 °C), temperature source Oral, resp. rate 18, height 165 cm (64.96\"), weight 92 kg (202 lb 13.2 oz), SpO2 98 %.    Lungs:  Normal effort and normal respiratory rate.  Breath sounds clear to auscultation.    Heart: Normal rate.  Regular rhythm.  Positive for murmur.  (Moderately harsh 2/6 systolic murmur upper sternal borders.)  Extremities: There is no dependent edema.    Neurological: Patient is alert.  (Oriented to person and place.).    Skin:  Warm and dry.                Vital signs in last 24 hours:  Temp:  [97.4 °F (36.3 °C)-98.6 °F (37 °C)] 97.5 °F (36.4 °C)  Heart Rate:  [50-59] 50  Resp:  [18] 18  BP: (118-162)/(42-71) 139/61    Intake/Output:  No intake or output data in the 24 hours ending 23 1845      Results from last 7 days   Lab Units 23  1302   WBC 10*3/mm3 4.68   HEMOGLOBIN g/dL 11.4*   PLATELETS 10*3/mm3 215     Results from last 7 days   Lab Units 23  1302   SODIUM mmol/L 142   POTASSIUM mmol/L 4.8   CHLORIDE mmol/L 104   CO2 mmol/L 26.8   BUN mg/dL 28*   CREATININE mg/dL 1.30*   GLUCOSE mg/dL 99   Estimated Creatinine Clearance: 36.1 mL/min (A) (by C-G formula based on SCr of 1.3 mg/dL (H)).  Results from last 7 days   Lab Units 23  1302   CALCIUM mg/dL 10.2   ALBUMIN g/dL 4.0   MAGNESIUM mg/dL 2.0     Results from last 7 days   Lab Units 23  1302   ALBUMIN g/dL 4.0   BILIRUBIN mg/dL 0.8   ALK PHOS U/L 88   AST (SGOT) U/L 23   ALT (SGPT) U/L 14       Assessment:  Syncope: Most consistent with vasovagal based on " "history.  Cardiology evaluation appreciated.  Echocardiogram noted.  Awaiting orthostatic blood pressures.  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  Hypertension: Per history.  Presently not on any medication for this.  Bradycardia: Check TSH.  CKD/prerenal:  Creatinine a bit above her baseline.  We will gently hydrate tonight.  Cerebrovascular disease: 50% stenosis right carotid Dopplers.  Recommend aspirin but I would like to know what the status is of her Eliquis.  Anticoagulated: Unclear why she is on Eliquis and is also listed as a \"historical medication\".  Request pharmacy to look into this.  Microcytic anemia: Check iron panels.    All problems new to this examiner.    Plan:  Please see above.  Discussed with patient    Antonio Segundo MD  4/5/2023  18:45 EDT    "

## 2023-04-05 NOTE — CONSULTS
Patient Name: Destiny Hawkins  :1940  82 y.o.    Date of Admission: 2023  Date of Consultation:  23  Encounter Provider: Junior Bender III, MD  Place of Service: [facility] CARDIOLOGY  Referring Provider: No ref. provider found  Patient Care Team:  Megha Carter APRN as PCP - General (Family Medicine)      Chief complaint: Syncope    History of Present Illness:     This is an 82-year-old female who presented to the emergency department after syncope.  She lives in an assisted living facility.  She was at lunch, while sitting there became very diaphoretic, became dizzy, stood up and then passed out.  She was having nausea at the time.  She was admitted for further evaluation and treatment.    She was noted to have some sinus bradycardia.  In talking to her family they state that she used to see a cardiologist [facility] [place].  They think she may have mild enlargement of her heart, they are not sure.  Unaware of any other heart issues, no prior history of syncope.    She has had some issues with near syncope in the past, each time was when she got nauseated and then diaphoretic.    She has been noted to have some bradycardia that sinus bradycardia since she has been here with a heart rate in the 50s and has been there consistently.  She is not on any meds that would slow down her heart rate.  She did have a 2.3-second pause earlier this morning while in bed.    Patient is very hard of hearing but has hearing aids in and she denies any chest pain or chest discomfort.  No feeling of palpitations or tachycardia.      No past medical history on file.    No past surgical history on file.      Prior to Admission medications    Medication Sig Start Date End Date Taking? Authorizing Provider   albuterol sulfate  (90 Base) MCG/ACT inhaler Inhale 2 puffs Every 4 (Four) Hours As Needed for Wheezing.   Yes Provider, MD Galindo   apixaban (ELIQUIS) 5 MG  tablet tablet Take 1 tablet by mouth 2 (Two) Times a Day.   Yes Galindo Edward MD   Fluticasone-Salmeterol (Advair Diskus) 250-50 MCG/ACT DISKUS Inhale 1 puff 2 (Two) Times a Day.   Yes Galindo Edward MD   furosemide (LASIX) 40 MG tablet Take 1 tablet by mouth Daily.   Yes Galindo Edward MD   ketorolac (ACULAR) 0.5 % ophthalmic solution Administer 1 drop to the right eye 4 (Four) Times a Day. 4/2/23  Yes Galindo Edward MD   meclizine (ANTIVERT) 12.5 MG tablet Take 1 tablet by mouth 3 (Three) Times a Day As Needed for Dizziness.   Yes Galindo Edward MD   ondansetron (ZOFRAN) 4 MG tablet Take 1 tablet by mouth Every 8 (Eight) Hours As Needed for Nausea or Vomiting.   Yes Galnido Edward MD   prednisoLONE acetate (PRED FORTE) 1 % ophthalmic suspension Administer 1 drop into the left eye 2 (Two) Times a Day. 2/21/23  Yes Galindo Edward MD   prednisoLONE acetate (PRED FORTE) 1 % ophthalmic suspension Administer 1 drop to the right eye 3 (Three) Times a Day.   Yes Galindo Edward MD   promethazine (PHENERGAN) 25 MG tablet Take 1 tablet by mouth Every 6 (Six) Hours As Needed for Nausea or Vomiting.   Yes Galindo Edward MD   sertraline (ZOLOFT) 50 MG tablet Take 1 tablet by mouth Daily.   Yes Galindo Edward MD   amoxicillin-clavulanate (AUGMENTIN) 875-125 MG per tablet Take 1 tablet by mouth 2 (Two) Times a Day. 2/20/23   Presley Francisco MD   metroNIDAZOLE (FLAGYL) 500 MG tablet Take 1 tablet by mouth 3 (Three) Times a Day. 2/20/23   Presley Francisco MD   ondansetron ODT (ZOFRAN-ODT) 8 MG disintegrating tablet Place 1 tablet under the tongue Every 8 (Eight) Hours As Needed for Nausea or Vomiting. 2/20/23   Presley Francisco MD       Allergies   Allergen Reactions   • Gabexate Mental Status Change   • Gadolinium Derivatives (Mr Contrast) Anaphylaxis   • Prednisone Anaphylaxis   • Sulfa Antibiotics Nausea And Vomiting   • Cephalexin Unknown - Low Severity    • Hydrocodone Unknown - Low Severity   • Levaquin [Levofloxacin] Nausea And Vomiting   • Penicillins Unknown - Low Severity   • Tetanus Toxoid Unknown - Low Severity   • Allopurinol Unknown - High Severity       Social History     Socioeconomic History   • Marital status:        No family history on file.    REVIEW OF SYSTEMS:   All systems reviewed.  Pertinent positives identified in HPI.  All other systems are negative.      Objective:     Vitals:    04/04/23 2240 04/05/23 0407 04/05/23 0500 04/05/23 0703   BP: 162/71 153/42  139/61   BP Location: Left arm Left arm  Left arm   Patient Position: Lying Lying  Lying   Pulse: 50 59  50   Resp: 18 18  18   Temp: 98.6 °F (37 °C) 97.4 °F (36.3 °C)  97.5 °F (36.4 °C)   TempSrc: Oral Oral  Oral   SpO2:  94%  98%   Weight:   91.9 kg (202 lb 9.6 oz)      Body mass index is 33.71 kg/m².    Physical Exam:  General Appearance:    Alert, cooperative, in no acute distress   Head:    Normocephalic, without obvious abnormality   Eyes:            Lids and lashes normal, conjunctivae and sclerae normal, no icterus, no pallor, corneas clear   Ears:    Ears appear intact with no abnormalities noted, hard of hearing with hearing aids in place   Throat:   No oral lesions, oral mucosa moist   Neck:   No adenopathy, supple, trachea midline, no thyromegaly, no carotid bruit, no JVD   Back:     No kyphosis present, no erythema or scars, no tenderness to palpation    Lungs:     Clear to auscultation,respirations regular, even and unlabored    Heart:    Regular rhythm and normal rate, normal S1 and S2, no murmur, no gallop, no rub, no click   Chest Wall:    No abnormalities observed   Abdomen:     Normal bowel sounds, no masses, no organomegaly, soft        non-tender, non-distended, no guarding   Extremities:   Moves all extremities well, no edema, no cyanosis, no redness   Pulses:  Bilateral carotids brisk   Skin:  Psychiatric:   No bleeding or rash    Alert and oriented, normal  mood and affect         Lab Review:     Results from last 7 days   Lab Units 04/04/23  1302   SODIUM mmol/L 142   POTASSIUM mmol/L 4.8   CHLORIDE mmol/L 104   CO2 mmol/L 26.8   BUN mg/dL 28*   CREATININE mg/dL 1.30*   CALCIUM mg/dL 10.2   BILIRUBIN mg/dL 0.8   ALK PHOS U/L 88   ALT (SGPT) U/L 14   AST (SGOT) U/L 23   GLUCOSE mg/dL 99     Results from last 7 days   Lab Units 04/04/23  1702 04/04/23  1302   HSTROP T ng/L 25* 17*     Results from last 7 days   Lab Units 04/04/23  1302   WBC 10*3/mm3 4.68   HEMOGLOBIN g/dL 11.4*   HEMATOCRIT % 37.2   PLATELETS 10*3/mm3 215     Results from last 7 days   Lab Units 04/04/23  1439   INR  0.96   APTT seconds 26.0     Results from last 7 days   Lab Units 04/04/23  1302   MAGNESIUM mg/dL 2.0                   I personally viewed and interpreted the patient's EKG/Telemetry data.                Current Facility-Administered Medications:   •  acetaminophen (TYLENOL) tablet 650 mg, 650 mg, Oral, Q4H PRN, Anne-Marie Alejandro MD  •  albuterol (PROVENTIL) nebulizer solution 0.083% 2.5 mg/3mL, 2.5 mg, Nebulization, Q4H PRN, Anne-Marie Alejandro MD  •  apixaban (ELIQUIS) tablet 5 mg, 5 mg, Oral, BID, Anne-Marie Alejandro MD, 5 mg at 04/05/23 0819  •  budesonide-formoterol (SYMBICORT) 160-4.5 MCG/ACT inhaler 2 puff, 2 puff, Inhalation, BID - RT, Anne-Marie Alejandro MD  •  ketorolac (ACULAR) 0.5 % ophthalmic solution 1 drop, 1 drop, Right Eye, 4x Daily, Anne-Marie Alejandro MD, 1 drop at 04/05/23 0819  •  melatonin tablet 3 mg, 3 mg, Oral, Nightly PRN, Anne-Marie Alejandro MD  •  ondansetron (ZOFRAN) tablet 4 mg, 4 mg, Oral, Q6H PRN **OR** ondansetron (ZOFRAN) injection 4 mg, 4 mg, Intravenous, Q6H PRN, Anne-Marie Alejandro MD  •  sertraline (ZOLOFT) tablet 50 mg, 50 mg, Oral, Daily, Anne-Marie Alejandro MD    Assessment and Plan:       Active Hospital Problems    Diagnosis  POA   • **Syncope, unspecified syncope type [R55]  Yes      Resolved Hospital Problems   No  resolved problems to display.     1.  Syncope, sounds most consistent with a vasovagal syncope.  Echocardiogram has been ordered.  I will also check orthostatics  2.  Family reports differential blood pressure in the upper extremities, upper extremity arterial Doppler has been ordered by primary team as well as carotid duplex    Junior Bender III, MD  04/05/23  09:24 EDT

## 2023-04-06 VITALS
SYSTOLIC BLOOD PRESSURE: 145 MMHG | WEIGHT: 202.16 LBS | RESPIRATION RATE: 18 BRPM | OXYGEN SATURATION: 93 % | HEIGHT: 65 IN | HEART RATE: 58 BPM | DIASTOLIC BLOOD PRESSURE: 72 MMHG | TEMPERATURE: 98.1 F | BODY MASS INDEX: 33.68 KG/M2

## 2023-04-06 LAB
ANION GAP SERPL CALCULATED.3IONS-SCNC: 4 MMOL/L (ref 5–15)
BASOPHILS # BLD AUTO: 0.03 10*3/MM3 (ref 0–0.2)
BASOPHILS NFR BLD AUTO: 0.7 % (ref 0–1.5)
BUN SERPL-MCNC: 20 MG/DL (ref 8–23)
BUN/CREAT SERPL: 19.6 (ref 7–25)
CALCIUM SPEC-SCNC: 9.5 MG/DL (ref 8.6–10.5)
CHLORIDE SERPL-SCNC: 107 MMOL/L (ref 98–107)
CO2 SERPL-SCNC: 27 MMOL/L (ref 22–29)
CREAT SERPL-MCNC: 1.02 MG/DL (ref 0.57–1)
DEPRECATED RDW RBC AUTO: 47.2 FL (ref 37–54)
EGFRCR SERPLBLD CKD-EPI 2021: 54.4 ML/MIN/1.73
EOSINOPHIL # BLD AUTO: 0.16 10*3/MM3 (ref 0–0.4)
EOSINOPHIL NFR BLD AUTO: 3.5 % (ref 0.3–6.2)
ERYTHROCYTE [DISTWIDTH] IN BLOOD BY AUTOMATED COUNT: 15.2 % (ref 12.3–15.4)
GLUCOSE SERPL-MCNC: 88 MG/DL (ref 65–99)
HCT VFR BLD AUTO: 33.7 % (ref 34–46.6)
HGB BLD-MCNC: 10.8 G/DL (ref 12–15.9)
IMM GRANULOCYTES # BLD AUTO: 0.01 10*3/MM3 (ref 0–0.05)
IMM GRANULOCYTES NFR BLD AUTO: 0.2 % (ref 0–0.5)
IRON 24H UR-MRATE: 58 MCG/DL (ref 37–145)
IRON SATN MFR SERPL: 17 % (ref 20–50)
LYMPHOCYTES # BLD AUTO: 1.01 10*3/MM3 (ref 0.7–3.1)
LYMPHOCYTES NFR BLD AUTO: 22.3 % (ref 19.6–45.3)
MCH RBC QN AUTO: 27.2 PG (ref 26.6–33)
MCHC RBC AUTO-ENTMCNC: 32 G/DL (ref 31.5–35.7)
MCV RBC AUTO: 84.9 FL (ref 79–97)
MONOCYTES # BLD AUTO: 0.48 10*3/MM3 (ref 0.1–0.9)
MONOCYTES NFR BLD AUTO: 10.6 % (ref 5–12)
NEUTROPHILS NFR BLD AUTO: 2.83 10*3/MM3 (ref 1.7–7)
NEUTROPHILS NFR BLD AUTO: 62.7 % (ref 42.7–76)
NRBC BLD AUTO-RTO: 0 /100 WBC (ref 0–0.2)
PLATELET # BLD AUTO: 180 10*3/MM3 (ref 140–450)
PMV BLD AUTO: 9.6 FL (ref 6–12)
POTASSIUM SERPL-SCNC: 4.3 MMOL/L (ref 3.5–5.2)
RBC # BLD AUTO: 3.97 10*6/MM3 (ref 3.77–5.28)
SODIUM SERPL-SCNC: 138 MMOL/L (ref 136–145)
TIBC SERPL-MCNC: 340 MCG/DL (ref 298–536)
TRANSFERRIN SERPL-MCNC: 228 MG/DL (ref 200–360)
TSH SERPL DL<=0.05 MIU/L-ACNC: 2.63 UIU/ML (ref 0.27–4.2)
WBC NRBC COR # BLD: 4.52 10*3/MM3 (ref 3.4–10.8)

## 2023-04-06 PROCEDURE — 80048 BASIC METABOLIC PNL TOTAL CA: CPT | Performed by: HOSPITALIST

## 2023-04-06 PROCEDURE — 85025 COMPLETE CBC W/AUTO DIFF WBC: CPT | Performed by: HOSPITALIST

## 2023-04-06 PROCEDURE — 94799 UNLISTED PULMONARY SVC/PX: CPT

## 2023-04-06 PROCEDURE — 99232 SBSQ HOSP IP/OBS MODERATE 35: CPT | Performed by: INTERNAL MEDICINE

## 2023-04-06 PROCEDURE — 83540 ASSAY OF IRON: CPT | Performed by: HOSPITALIST

## 2023-04-06 PROCEDURE — 63710000001 ONDANSETRON PER 8 MG: Performed by: INTERNAL MEDICINE

## 2023-04-06 PROCEDURE — 94664 DEMO&/EVAL PT USE INHALER: CPT

## 2023-04-06 PROCEDURE — 84443 ASSAY THYROID STIM HORMONE: CPT | Performed by: HOSPITALIST

## 2023-04-06 PROCEDURE — 84466 ASSAY OF TRANSFERRIN: CPT | Performed by: HOSPITALIST

## 2023-04-06 PROCEDURE — G0378 HOSPITAL OBSERVATION PER HR: HCPCS

## 2023-04-06 RX ORDER — MECLIZINE HCL 12.5 MG/1
12.5 TABLET ORAL ONCE
Status: COMPLETED | OUTPATIENT
Start: 2023-04-06 | End: 2023-04-06

## 2023-04-06 RX ORDER — FUROSEMIDE 40 MG/1
20 TABLET ORAL DAILY
Start: 2023-04-06

## 2023-04-06 RX ADMIN — PREDNISOLONE ACETATE 1 DROP: 10 SUSPENSION/ DROPS OPHTHALMIC at 08:30

## 2023-04-06 RX ADMIN — ACETAMINOPHEN 650 MG: 325 TABLET, FILM COATED ORAL at 08:40

## 2023-04-06 RX ADMIN — BUDESONIDE AND FORMOTEROL FUMARATE DIHYDRATE 2 PUFF: 160; 4.5 AEROSOL RESPIRATORY (INHALATION) at 07:46

## 2023-04-06 RX ADMIN — KETOROLAC TROMETHAMINE 1 DROP: 0.5 SOLUTION OPHTHALMIC at 08:30

## 2023-04-06 RX ADMIN — ONDANSETRON HYDROCHLORIDE 4 MG: 4 TABLET, FILM COATED ORAL at 08:40

## 2023-04-06 RX ADMIN — SERTRALINE 50 MG: 50 TABLET, FILM COATED ORAL at 08:29

## 2023-04-06 RX ADMIN — KETOROLAC TROMETHAMINE 1 DROP: 0.5 SOLUTION OPHTHALMIC at 12:39

## 2023-04-06 RX ADMIN — APIXABAN 5 MG: 5 TABLET, FILM COATED ORAL at 08:29

## 2023-04-06 RX ADMIN — MECLIZINE HCL 12.5 MG 12.5 MG: 12.5 TABLET ORAL at 12:36

## 2023-04-06 RX ADMIN — PREDNISOLONE ACETATE 1 DROP: 10 SUSPENSION/ DROPS OPHTHALMIC at 06:22

## 2023-04-06 NOTE — DISCHARGE SUMMARY
"                                                                   PHYSICIAN DISCHARGE SUMMARY                                                                        [facility]    Patient Identification:  Name: Destiny Hawkins  Age: 82 y.o.  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  TSH normal.  Hypertension: Per history.  Presently not on any medication for this.  CKD/prerenal:   Resolved   Cerebrovascular disease:  See discussion below.  Anticoagulated:  See discussion below.  Microcytic anemia:  Mild iron deficiency.      Hospital Course:  Pleasant 78-year-old female presents from a \"senior living facility\" she presents after a syncope episode that was preceded with nausea.  Please H&P for full details.  Cardiac enzymes are unremarkable EKG nonacute.  She is admitted and cardiology consultation was obtained.  There were no dysrhythmias during hospitalization aside from a mild chronic sinus bradycardia.  No significant orthostatic changes.  No recurrence no symptomatology.  Echocardiogram showed a somewhat hyperdynamic left ventricle and grade 2 diastolic dysfunction.  BUN/creatinine appear to be above her baseline presentation she did undergo gentle diuresis overnight.  She is back to baseline today.  Low volume could certainly have also played a role in the syncope.  On presentation carotid Doppler study was ordered and she does have a 50% narrowing on the right carotid artery.  Discussed adding aspirin to her regime.  This was discussed both with the patient and with her son over the phone today.  She has however already on Eliquis and has been for many years at this point.  [place] and the reasons are " "not entirely clear but from speaking to the patient appears to have an adequate reason for this.  She states she has a \"propensity for blood clots\" and apparently has had more than 1.  We discussed the pros and cons of adding aspirin to this regime.  At this point I am going to defer this to the primary care physician as it would be very helpful to be able to have more records concerning the patient being on chronic anticoagulation.  Also noted to have a mildly microcytic anemia presentation.  Evaluation shows mild iron deficiency.  I will go ahead start her on oral supplements on discharge.  Discussed evaluation and findings with her son over the phone.  I have recommended follow-up with a internal medicine specialist.      Consults:     Consults     Date and Time Order Name Status Description    4/5/2023  7:56 AM Inpatient Cardiology Consult Completed     4/4/2023  6:24 PM CONSUELO (on-call MD unless specified) Details              Discharge Exam:  Afebrile vital signs stable.  Well-developed well-nourished female no apparent distress.  Lungs clear to auscultation good air movement.  Heart regular rate and rhythm.  Abdomen with normal bowel sounds.  No tenderness again megaly guarding masses.  Extremities with no clubbing cyanosis or edema.  She is alert and oriented to person and place although could not recall which hospital she is in at this point.     Disposition:  Assisted living    Patient Instructions:      Discharge Medications      Changes to Medications      Instructions Start Date   furosemide 40 MG tablet  Commonly known as: LASIX  What changed: how much to take   20 mg, Oral, Daily         Continue These Medications      Instructions Start Date   Advair Diskus 250-50 MCG/ACT DISKUS  Generic drug: Fluticasone-Salmeterol   1 puff, Inhalation, 2 Times Daily - RT      albuterol sulfate  (90 Base) MCG/ACT inhaler  Commonly known as: PROVENTIL HFA;VENTOLIN HFA;PROAIR HFA   2 puffs, Inhalation, Every 4 " Hours PRN      apixaban 5 MG tablet tablet  Commonly known as: ELIQUIS   5 mg, Oral, 2 Times Daily      ketorolac 0.5 % ophthalmic solution  Commonly known as: ACULAR   1 drop, Right Eye, 4 Times Daily      meclizine 12.5 MG tablet  Commonly known as: ANTIVERT   12.5 mg, Oral, 3 Times Daily PRN      ondansetron ODT 8 MG disintegrating tablet  Commonly known as: ZOFRAN-ODT   8 mg, Sublingual, Every 8 Hours PRN      prednisoLONE acetate 1 % ophthalmic suspension  Commonly known as: PRED FORTE   1 drop, Right Eye, 3 Times Daily      prednisoLONE acetate 1 % ophthalmic suspension  Commonly known as: PRED FORTE   1 drop, Left Eye, 2 Times Daily      sertraline 50 MG tablet  Commonly known as: ZOLOFT   50 mg, Oral, Daily         Stop These Medications    amoxicillin-clavulanate 875-125 MG per tablet  Commonly known as: AUGMENTIN     metroNIDAZOLE 500 MG tablet  Commonly known as: FLAGYL     ondansetron 4 MG tablet  Commonly known as: ZOFRAN     promethazine 25 MG tablet  Commonly known as: PHENERGAN          Diet Instructions     Diet: Cardiac Diets; Healthy Heart (2-3 Na+); Regular Texture (IDDSI 7); Thin (IDDSI 0)      Discharge Diet: Cardiac Diets    Cardiac Diet: Healthy Heart (2-3 Na+)    Texture: Regular Texture (IDDSI 7)    Fluid Consistency: Thin (IDDSI 0)        No future appointments.  Additional Instructions for the Follow-ups that You Need to Schedule     Discharge Follow-up with PCP   As directed       Currently Documented PCP:    Megha Carter APRN    PCP Phone Number:    [phone]     Follow Up Details: 1 week            Follow-up Information     Megha Carter APRN .    Specialty: Family Medicine  Why: 1 week  Contact information:  [address] [facility] [address]  [phone]                       Discharge Order (From admission, onward)     Start     Ordered    04/06/23 1033  Discharge patient  Once        Comments: Discharge to assisted living facility.   Expected Discharge Date:  04/06/23    Discharge Disposition: Home or Self Care    Physician of Record for Attribution - Please select from Treatment Team: ANTONIO SEGUNDO [[id number]]    Review needed by CMO to determine Physician of Record: No       Question Answer Comment   Physician of Record for Attribution - Please select from Treatment Team ANTONIO SEGUNDO    Review needed by CMO to determine Physician of Record No        04/06/23 1040                  Total time spent discharging patient including evaluation,post hospitalization follow up,  medication and post hospitalization instructions and education total time exceeds 30 minutes.    Signed:  Antonio Segundo MD  4/6/2023  10:41 EDT    EMR Dragon/Transcription disclaimer:   Much of this encounter note is an electronic transcription/translation of spoken language to printed text. The electronic translation of spoken language may permit erroneous, or at times, nonsensical words or phrases to be inadvertently transcribed; Although I have reviewed the note for such errors, some may still exist.

## 2023-04-06 NOTE — PLAN OF CARE
Problem: Adult Inpatient Plan of Care  Goal: Plan of Care Review  Outcome: Ongoing, Progressing  Flowsheets (Taken 4/6/2023 4291)  Plan of Care Reviewed With: patient  Outcome Evaluation: Pt refused to wear Oxygen after being advised of the benefits and health hazards of not uses this medication. Pt still reused to use. Assisted to the bathroom, and reminded her that she needed to use the call light for assistance to prevent falls. Will continue to educate and provide a safe environment for the pt. WCTM.   Goal Outcome Evaluation:  Plan of Care Reviewed With: patient           Outcome Evaluation: Pt refused to wear Oxygen after being advised of the benefits and health hazards of not uses this medication. Pt still reused to use. Assisted to the bathroom, and reminded her that she needed to use the call light for assistance to prevent falls. Will continue to educate and provide a safe environment for the pt. WCTM.

## 2023-04-06 NOTE — CASE MANAGEMENT/SOCIAL WORK
Case Management Discharge Note      Final Note: Home no additional dc orders noted for CCP.[facility] Referral line for PCP provided to son per his request. Linda PANIAGUA/CCP         Selected Continued Care - Admitted Since 4/4/2023     Destination    No services have been selected for the patient.              Durable Medical Equipment    No services have been selected for the patient.              Dialysis/Infusion    No services have been selected for the patient.              Home Medical Care    No services have been selected for the patient.              Therapy    No services have been selected for the patient.              Community Resources    No services have been selected for the patient.              Community & DME    No services have been selected for the patient.                  Transportation Services  Private: Car    Final Discharge Disposition Code: 01 - home or self-care

## 2023-04-06 NOTE — PROGRESS NOTES
"    Patient Name: Destiny Hawkins  :1940  82 y.o.      Patient Care Team:  Megha Carter APRN as PCP - General (Family Medicine)    Chief Complaint: syncope    Interval History: [place], denies pain, No change BP lying to sitting, apparently would not stand        Objective   Vital Signs  Temp:  [98 °F (36.7 °C)-98.2 °F (36.8 °C)] 98.1 °F (36.7 °C)  Heart Rate:  [50-64] 58  Resp:  [18] 18  BP: (130-145)/(47-72) 145/72  No intake or output data in the 24 hours ending 23 0753  Flowsheet Rows    Flowsheet Row First Filed Value   Admission Height 165 cm (64.96\") Documented at 2023 1409   Admission Weight 91.9 kg (202 lb 9.6 oz) Documented at 2023 0500          Physical Exam:   General Appearance:   in no acute distress   Lungs:     Clear to auscultation.  Normal respiratory effort and rate.      Heart:    Regular rhythm and normal rate, normal S1 and S2, no murmurs, gallops or rubs.     Chest Wall:    No abnormalities observed   Abdomen:     Soft, nontender, positive bowel sounds.     Extremities:   no cyanosis, clubbing or edema.  No marked joint deformities.      Results Review:    Results from last 7 days   Lab Units 23  0500   SODIUM mmol/L 138   POTASSIUM mmol/L 4.3   CHLORIDE mmol/L 107   CO2 mmol/L 27.0   BUN mg/dL 20   CREATININE mg/dL 1.02*   GLUCOSE mg/dL 88   CALCIUM mg/dL 9.5     Results from last 7 days   Lab Units 23  1702 23  1302   HSTROP T ng/L 25* 17*     Results from last 7 days   Lab Units 23  0500   WBC 10*3/mm3 4.52   HEMOGLOBIN g/dL 10.8*   HEMATOCRIT % 33.7*   PLATELETS 10*3/mm3 180     Results from last 7 days   Lab Units 23  1439   INR  0.96   APTT seconds 26.0     Results from last 7 days   Lab Units 23  1302   MAGNESIUM mg/dL 2.0                   Medication Review:   apixaban, 5 mg, Oral, BID  budesonide-formoterol, 2 puff, Inhalation, BID - RT  ketorolac, 1 drop, Right Eye, 4x Daily  prednisoLONE acetate, 1 drop, Right Eye, " Q8H  prednisoLONE acetate, 1 drop, Left Eye, Q12H  sertraline, 50 mg, Oral, Daily         Pharmacy Consult,   sodium chloride, 75 mL/hr      Results for orders placed during the hospital encounter of 04/04/23    Adult Transthoracic Echo Complete W/ Cont if Necessary Per Protocol    Interpretation Summary  •  Left ventricular systolic function is hyperdynamic (EF > 70%). Calculated left ventricular EF = 74.1% Left ventricular ejection fraction appears to be greater than 70%.  •  Left ventricular diastolic function is consistent with (grade II w/high LAP) pseudonormalization.  •  Mild aortic valve stenosis is present.  •  Peak velocity of the flow distal to the aortic valve is 215.5 cm/s. Aortic valve mean pressure gradient is 8 mmHg. Aortic valve dimensionless index is 0.5 .  •  Estimated right ventricular systolic pressure from tricuspid regurgitation is normal (<35 mmHg).      Assessment & Plan     Active Hospital Problems    Diagnosis  POA   • **Syncope, unspecified syncope type [R55]  Yes      Resolved Hospital Problems   No resolved problems to display.     1.  Syncope, sounds most consistent with a vasovagal syncope.  Echocardiogram unremarkable  2.  Family reports differential blood pressure in the upper extremities, upper extremity arterial Doppler unremarkable  3.  No evidence of any cardiac pathology contributing to symptoms, we will sign off, please call if we can help in any way.    Junior Bender III, MD  [facility]  04/06/23  07:53 EDT

## 2023-04-06 NOTE — PROGRESS NOTES
Clinical Pharmacy Services: Medication History    Destiny Hawkins is a 82 y.o. female presenting to [facility] for Bradycardia [R00.1]  Elevated troponin [R77.8]  JUJU (acute kidney injury) [N17.9]  Syncope, unspecified syncope type [R55]  Anemia, unspecified type [D64.9]    She  has no past medical history on file.    Allergies as of 04/04/2023 - Reviewed 04/04/2023   Allergen Reaction Noted    Gabexate Mental Status Change 04/04/2023    Gadolinium derivatives (mr contrast) Anaphylaxis 04/04/2023    Prednisone Anaphylaxis 04/04/2023    Sulfa antibiotics Nausea And Vomiting 02/20/2023    Cephalexin Unknown - Low Severity 04/04/2023    Hydrocodone Unknown - Low Severity 04/04/2023    Levaquin [levofloxacin] Nausea And Vomiting 02/20/2023    Penicillins Unknown - Low Severity 04/04/2023    Tetanus toxoid Unknown - Low Severity 09/09/2013    Allopurinol Unknown - High Severity 04/04/2023       Medication information was obtained from: Son, previous MD office in [place]   Pharmacy and Phone Number: [facility]  ([place]) [phone]  MD office: [phone]    Prior to Admission Medications       Prescriptions Last Dose Informant Patient Reported? Taking?    albuterol sulfate  (90 Base) MCG/ACT inhaler  Child Yes Yes    Inhale 2 puffs Every 4 (Four) Hours As Needed for Wheezing.    apixaban (ELIQUIS) 5 MG tablet tablet  Child, Medication Bottle Yes Yes    Take 1 tablet by mouth 2 (Two) Times a Day.    Fluticasone-Salmeterol (Advair Diskus) 250-50 MCG/ACT DISKUS  Child Yes Yes    Inhale 1 puff 2 (Two) Times a Day.    furosemide (LASIX) 40 MG tablet  Child, Medication Bottle Yes Yes    Take 1 tablet by mouth Daily.    ketorolac (ACULAR) 0.5 % ophthalmic solution  Child Yes Yes    Administer 1 drop to the right eye 4 (Four) Times a Day.    meclizine (ANTIVERT) 12.5 MG tablet  Child, Medication Bottle Yes Yes    Take 1 tablet by mouth 3 (Three) Times a Day As Needed for Dizziness.    ondansetron (ZOFRAN) 4 MG  "tablet  Child, Medication Bottle Yes Yes    Take 1 tablet by mouth Every 8 (Eight) Hours As Needed for Nausea or Vomiting.    prednisoLONE acetate (PRED FORTE) 1 % ophthalmic suspension  Child Yes Yes    Administer 1 drop into the left eye 2 (Two) Times a Day.    prednisoLONE acetate (PRED FORTE) 1 % ophthalmic suspension  Child Yes Yes    Administer 1 drop to the right eye 3 (Three) Times a Day.    promethazine (PHENERGAN) 25 MG tablet  Child, Medication Bottle Yes Yes    Take 1 tablet by mouth Every 6 (Six) Hours As Needed for Nausea or Vomiting.    sertraline (ZOLOFT) 50 MG tablet  Child, Medication Bottle Yes Yes    Take 1 tablet by mouth Daily.    amoxicillin-clavulanate (AUGMENTIN) 875-125 MG per tablet   No No    Take 1 tablet by mouth 2 (Two) Times a Day.    metroNIDAZOLE (FLAGYL) 500 MG tablet   No No    Take 1 tablet by mouth 3 (Three) Times a Day.    ondansetron ODT (ZOFRAN-ODT) 8 MG disintegrating tablet   No No    Place 1 tablet under the tongue Every 8 (Eight) Hours As Needed for Nausea or Vomiting.            Medication notes: S/w son over the phone, pt previously lived in [place] and saw a Dr. Gaytan (#[phone]). Called and spoke with Lee Ann who clarified that apixaban was started for indication \"DVT/PE\". Apixaban was started in 05/2018, she was previously on Xarelto (07/2016) and coumadin prior to that. MD office cannot confirm were the DVT/PE was located or who the original prescriber was on the AC.      This medication list is complete to the best of my knowledge as of 4/6/2023    Please call if questions.    Brayden Amaral, PharmD   4/6/2023 08:25 EDT      "

## 2023-05-01 ENCOUNTER — APPOINTMENT (OUTPATIENT)
Dept: WOMENS IMAGING | Facility: HOSPITAL | Age: 83
End: 2023-05-01
Payer: MEDICARE

## 2023-05-01 PROCEDURE — G0279 TOMOSYNTHESIS, MAMMO: HCPCS | Performed by: RADIOLOGY

## 2023-05-01 PROCEDURE — 76642 ULTRASOUND BREAST LIMITED: CPT | Performed by: RADIOLOGY

## 2023-05-01 PROCEDURE — 77066 DX MAMMO INCL CAD BI: CPT | Performed by: RADIOLOGY

## 2023-05-02 ENCOUNTER — OFFICE VISIT (OUTPATIENT)
Dept: FAMILY MEDICINE CLINIC | Facility: CLINIC | Age: 83
End: 2023-05-02
Payer: MEDICARE

## 2023-05-02 VITALS
TEMPERATURE: 97.8 F | DIASTOLIC BLOOD PRESSURE: 71 MMHG | RESPIRATION RATE: 16 BRPM | WEIGHT: 197.6 LBS | SYSTOLIC BLOOD PRESSURE: 121 MMHG | BODY MASS INDEX: 32.92 KG/M2 | HEIGHT: 65 IN

## 2023-05-02 DIAGNOSIS — J45.20 MILD INTERMITTENT ASTHMA WITHOUT COMPLICATION: ICD-10-CM

## 2023-05-02 DIAGNOSIS — E03.9 HYPOTHYROIDISM, UNSPECIFIED TYPE: ICD-10-CM

## 2023-05-02 DIAGNOSIS — B36.9 FUNGAL INFECTION OF SKIN: ICD-10-CM

## 2023-05-02 DIAGNOSIS — I51.89 DIASTOLIC DYSFUNCTION: Primary | ICD-10-CM

## 2023-05-02 DIAGNOSIS — E78.2 MIXED HYPERLIPIDEMIA: ICD-10-CM

## 2023-05-02 DIAGNOSIS — I26.99 PULMONARY EMBOLISM, OTHER, UNSPECIFIED CHRONICITY, UNSPECIFIED WHETHER ACUTE COR PULMONALE PRESENT: ICD-10-CM

## 2023-05-02 RX ORDER — CELECOXIB 200 MG/1
200 CAPSULE ORAL DAILY
COMMUNITY

## 2023-05-02 RX ORDER — DIFLUPREDNATE OPHTHALMIC 0.5 MG/ML
EMULSION OPHTHALMIC
COMMUNITY
Start: 2023-04-25

## 2023-05-02 RX ORDER — CLOTRIMAZOLE 1 %
1 CREAM (GRAM) TOPICAL 2 TIMES DAILY
Qty: 14 G | Refills: 3 | Status: SHIPPED | OUTPATIENT
Start: 2023-05-02

## 2023-05-02 RX ORDER — LEVOTHYROXINE SODIUM 0.07 MG/1
75 TABLET ORAL DAILY
COMMUNITY

## 2023-05-02 NOTE — PROGRESS NOTES
"Chief Complaint  Establish Care    Subjective        Destiny Hawkins presents to [facility]  History of Present Illness  Known case of Asthma / ? COPD, hypothyroidism,dependent edema  Cc  Establishing medical care.  Having bowel movements twice in a day , as per daughter in law having loose stools.  Living in [facility]  Review of system is negative for fever, headache, chest pain, shortness of breath, palpitation, nausea, vomiting, any recent change in bladder habits.        Objective   Vital Signs:  /71   Temp 97.8 °F (36.6 °C)   Resp 16   Ht 165 cm (64.96\")   Wt 89.6 kg (197 lb 9.6 oz)   BMI 32.92 kg/m²   Estimated body mass index is 32.92 kg/m² as calculated from the following:    Height as of this encounter: 165 cm (64.96\").    Weight as of this encounter: 89.6 kg (197 lb 9.6 oz).             Physical Exam  HENT:      Mouth/Throat:      Mouth: Mucous membranes are moist.   Eyes:      Conjunctiva/sclera: Conjunctivae normal.      Pupils: Pupils are equal, round, and reactive to light.   Cardiovascular:      Rate and Rhythm: Normal rate.      Pulses: Normal pulses.      Heart sounds: Normal heart sounds.   Pulmonary:      Effort: Pulmonary effort is normal.      Breath sounds: Normal breath sounds.   Abdominal:      General: Bowel sounds are normal.      Palpations: Abdomen is soft.   Skin:     General: Skin is warm.      Comments: Excoriation present on the left side of the groin area  Patient is urine and fecal incontinent both and wearing pads all the time   Neurological:      Mental Status: She is alert and oriented to person, place, and time.        Result Review :                   Assessment and Plan   Diagnoses and all orders for this visit:    1. Diastolic dysfunction (Primary)  Comments:  continue lasix 20 mg Po daily.  Orders:  -     CBC Auto Differential; Future  -     Comprehensive Metabolic Panel; Future  -     Lipid Panel With / Chol / " HDL Ratio; Future  -     Urinalysis With Microscopic - Urine, Clean Catch; Future    2. Mild intermittent asthma without complication  Comments:  albuterol inhaler as needed  Orders:  -     CBC Auto Differential; Future  -     Comprehensive Metabolic Panel; Future  -     Lipid Panel With / Chol / HDL Ratio; Future  -     Urinalysis With Microscopic - Urine, Clean Catch; Future    3. Hypothyroidism, unspecified type  Comments:  continue levothyroxine 75 mcg Po daily, will repeat TSH in next visit  Orders:  -     TSH; Future    4. Mixed hyperlipidemia  -     Lipid Panel With / Chol / HDL Ratio; Future    5. Pulmonary embolism, other, unspecified chronicity, unspecified whether acute cor pulmonale present  Comments:  on eliquis 5 mg PO BID, continue same, pt stated she was told she need to take blood thinner for whole life    6. Fungal infection of skin  Comments:  use clotrimazole cream twice a day, change diaper frequently  Orders:  -     clotrimazole (LOTRIMIN) 1 % cream; Apply 1 application topically to the appropriate area as directed 2 (Two) Times a Day.  Dispense: 14 g; Refill: 3    RTC in 6 months for wellness check up          Follow Up   No follow-ups on file.  Patient was given instructions and counseling regarding her condition or for health maintenance advice. Please see specific information pulled into the AVS if appropriate.

## 2024-01-30 ENCOUNTER — OFFICE VISIT (OUTPATIENT)
Dept: FAMILY MEDICINE CLINIC | Facility: CLINIC | Age: 84
End: 2024-01-30
Payer: COMMERCIAL

## 2024-01-30 VITALS
WEIGHT: 221.8 LBS | DIASTOLIC BLOOD PRESSURE: 78 MMHG | TEMPERATURE: 98.4 F | SYSTOLIC BLOOD PRESSURE: 140 MMHG | BODY MASS INDEX: 36.96 KG/M2 | OXYGEN SATURATION: 97 % | RESPIRATION RATE: 16 BRPM | HEIGHT: 65 IN | HEART RATE: 65 BPM

## 2024-01-30 DIAGNOSIS — I10 UNCONTROLLED HYPERTENSION: ICD-10-CM

## 2024-01-30 DIAGNOSIS — I51.89 DIASTOLIC DYSFUNCTION: ICD-10-CM

## 2024-01-30 DIAGNOSIS — M79.89 LEG SWELLING: Primary | ICD-10-CM

## 2024-01-30 RX ORDER — LOSARTAN POTASSIUM 50 MG/1
50 TABLET ORAL DAILY
Qty: 30 TABLET | Refills: 0 | Status: SHIPPED | OUTPATIENT
Start: 2024-01-30

## 2024-01-30 RX ORDER — FUROSEMIDE 20 MG/1
20 TABLET ORAL 2 TIMES DAILY
Qty: 60 TABLET | Refills: 2 | Status: SHIPPED | OUTPATIENT
Start: 2024-01-30 | End: 2024-01-30 | Stop reason: SDUPTHER

## 2024-01-30 RX ORDER — FUROSEMIDE 20 MG/1
20 TABLET ORAL 2 TIMES DAILY
Qty: 60 TABLET | Refills: 2 | Status: SHIPPED | OUTPATIENT
Start: 2024-01-30

## 2024-01-30 RX ORDER — LOSARTAN POTASSIUM 50 MG/1
50 TABLET ORAL DAILY
Qty: 30 TABLET | Refills: 0 | Status: SHIPPED | OUTPATIENT
Start: 2024-01-30 | End: 2024-01-30 | Stop reason: SDUPTHER

## 2024-01-30 NOTE — PROGRESS NOTES
Chief Complaint   Patient presents with    Leg Swelling     Leg swelling started last week, but got worse yesterday            History of Present Illness:  Patient is an 83-year-old female with history of diastolic heart dysfunction, hypertension, hyperlipidemia, asthma, hypothyroidism & PE presents to the clinic today from assisted living facility with complaints of bilateral leg swelling and left leg pain.  She states she has had bilateral leg swelling for a while however left leg pain started yesterday and she has noted that swelling has increased more. She was supposed to be taking lasix 20 mg daily but is not compliant with medications as she claims it makes her go to the bathroom more often than usual and she does not like that.  Patient has been putting off her lab work that was scheduled in the past however she wants to get them done today.    PMH:   Outpatient Medications Prior to Visit   Medication Sig Dispense Refill    albuterol sulfate  (90 Base) MCG/ACT inhaler Inhale 2 puffs Every 4 (Four) Hours As Needed for Wheezing.      apixaban (ELIQUIS) 5 MG tablet tablet Take 1 tablet by mouth 2 (Two) Times a Day.      celecoxib (CeleBREX) 200 MG capsule Take 1 capsule by mouth Daily.      clotrimazole (LOTRIMIN) 1 % cream Apply 1 application topically to the appropriate area as directed 2 (Two) Times a Day. 14 g 3    difluprednate (DUREZOL) 0.05 % ophthalmic emulsion       ketorolac (ACULAR) 0.5 % ophthalmic solution Administer 1 drop to the right eye 4 (Four) Times a Day.      levothyroxine (SYNTHROID, LEVOTHROID) 75 MCG tablet Take 1 tablet by mouth Daily.      meclizine (ANTIVERT) 12.5 MG tablet Take 1 tablet by mouth 3 (Three) Times a Day As Needed for Dizziness.      furosemide (LASIX) 40 MG tablet Take 0.5 tablets by mouth Daily.       No facility-administered medications prior to visit.      Allergies   Allergen Reactions    Gadolinium Derivatives (Mr Contrast) Anaphylaxis    Prednisone  "Anaphylaxis    Gabexate Mental Status Change    Allopurinol Unknown - High Severity    Cephalexin Unknown - Low Severity    Hydrocodone Unknown - Low Severity    Levaquin [Levofloxacin] Nausea And Vomiting    Penicillins Unknown - Low Severity    Sulfa Antibiotics Nausea And Vomiting    Tetanus Toxoid Unknown - Low Severity     No past surgical history on file.  family history is not on file.   reports that she has never smoked. She has never used smokeless tobacco. She reports that she does not drink alcohol and does not use drugs.     /78 (BP Location: Right arm, Patient Position: Sitting, Cuff Size: Adult)   Pulse 65   Temp 98.4 °F (36.9 °C) (Oral)   Resp 16   Ht 165 cm (64.96\")   Wt 101 kg (221 lb 12.8 oz)   SpO2 97%   BMI 36.95 kg/m²   Physical Exam  HENT:      Head: Normocephalic and atraumatic.   Cardiovascular:      Rate and Rhythm: Normal rate and regular rhythm.      Heart sounds: Normal heart sounds.   Pulmonary:      Breath sounds: Normal breath sounds.   Musculoskeletal:         General: Swelling and tenderness present.      Right lower leg: Edema present.      Left lower leg: Edema present.      Comments: [place] swollen legs bilaterally, 2+ pitting edema to up to the knee. Left leg tender to touch.  No erythema or differential warmth. No rash or skin discoloration. No varicose veins.   Skin:     Findings: No erythema or rash.   Neurological:      Mental Status: She is alert and oriented to person, place, and time.   Psychiatric:         Mood and Affect: Mood normal.          The following data was reviewed by: Elise Mcrae MD on 01/30/2024:  Common labs          2/20/2023    12:38 2/20/2023    15:02 4/4/2023    13:02 4/6/2023    05:00   Common Labs   Glucose  81  99  88    BUN  15  28  20    Creatinine  1.04  1.30  1.02    Sodium  137  142  138    Potassium  4.5  4.8  4.3    Chloride  102  104  107    Calcium  10.1  10.2  9.5    Albumin  3.9  4.0     Total Bilirubin  1.2  0.8   "   Alkaline Phosphatase  97  88     AST (SGOT)  14  23     ALT (SGPT)  8  14     WBC 6.72   4.68  4.52    Hemoglobin 12.7   11.4  10.8    Hematocrit 40.5   37.2  33.7    Platelets 274   215  180           Diagnoses and all orders for this visit:    1. Leg swelling (Primary)  Assessment & Plan:  Bilateral tender pitting edema likely due to diastolic heart dysfunction  Educated on the importance of medication compliance  To take furosemide 20 mg twice daily for now and will continue to monitor  Counseled on leg elevation and compression stockings  We get a duplex venous ultrasound to r/o DVT  Follow up in 4 weeks    Orders:  -     Duplex Venous Lower Extremity - Bilateral CAR; Future  -     Discontinue: furosemide (Lasix) 20 MG tablet; Take 1 tablet by mouth 2 (Two) Times a Day.  Dispense: 60 tablet; Refill: 2  -     furosemide (Lasix) 20 MG tablet; Take 1 tablet by mouth 2 (Two) Times a Day.  Dispense: 60 tablet; Refill: 2    2. Uncontrolled hypertension  Assessment & Plan:  Elevated BP in the office. Pt has h/o elevated BP however has not been on medication  Hypertension is unchanged.  Discussed starting antihypertensive for BP control in addition to lasix  Started on losartan 50 mg daily  Dietary sodium restriction.  Regular aerobic exercise.  Ambulatory blood pressure monitoring.  Encouraged to monitor BP regularly and keep records for review at the next regular appointment  Blood pressure will be reassessed in 4 weeks.    Orders:  -     CBC Auto Differential  -     Comprehensive Metabolic Panel  -     Lipid Panel With / Chol / HDL Ratio  -     TSH  -     Urinalysis With Microscopic - Urine, Clean Catch  -     Discontinue: losartan (Cozaar) 50 MG tablet; Take 1 tablet by mouth Daily.  Dispense: 30 tablet; Refill: 0  -     losartan (Cozaar) 50 MG tablet; Take 1 tablet by mouth Daily.  Dispense: 30 tablet; Refill: 0    3. Diastolic dysfunction  Assessment & Plan:  ECHO(04/23) revealed LV diastolic dysfunction with LV  EF of 70%, mostly due to hypertensive heart disease  Counseled on the importance of controlling blood pressure with antihypertensive. Maintaining a heart healthy diet and regular exercise.               Return in about 4 weeks (around 2/27/2024) for Recheck.

## 2024-01-30 NOTE — ASSESSMENT & PLAN NOTE
Elevated BP in the office. Pt has h/o elevated BP however has not been on medication  Hypertension is unchanged.  Discussed starting antihypertensive for BP control in addition to lasix  Started on losartan 50 mg daily  Dietary sodium restriction.  Regular aerobic exercise.  Ambulatory blood pressure monitoring.  Encouraged to monitor BP regularly and keep records for review at the next regular appointment  Blood pressure will be reassessed in 4 weeks.

## 2024-01-30 NOTE — ASSESSMENT & PLAN NOTE
Bilateral tender pitting edema likely due to diastolic heart dysfunction  Educated on the importance of medication compliance  To take furosemide 20 mg twice daily for now and will continue to monitor  Counseled on leg elevation and compression stockings  We get a duplex venous ultrasound to r/o DVT  Follow up in 4 weeks

## 2024-01-30 NOTE — ASSESSMENT & PLAN NOTE
ECHO(04/23) revealed LV diastolic dysfunction with LV EF of 70%, mostly due to hypertensive heart disease  Counseled on the importance of controlling blood pressure with antihypertensive. Maintaining a heart healthy diet and regular exercise.

## 2024-01-31 LAB
ALBUMIN SERPL-MCNC: 3.9 G/DL (ref 3.7–4.7)
ALBUMIN/GLOB SERPL: 1.2 {RATIO} (ref 1.2–2.2)
ALP SERPL-CCNC: 104 IU/L (ref 44–121)
ALT SERPL-CCNC: 9 IU/L (ref 0–32)
APPEARANCE UR: CLEAR
AST SERPL-CCNC: 18 IU/L (ref 0–40)
BACTERIA #/AREA URNS HPF: NORMAL /[HPF]
BASOPHILS # BLD AUTO: 0.1 X10E3/UL (ref 0–0.2)
BASOPHILS NFR BLD AUTO: 1 %
BILIRUB SERPL-MCNC: 0.8 MG/DL (ref 0–1.2)
BILIRUB UR QL STRIP: NEGATIVE
BUN SERPL-MCNC: 20 MG/DL (ref 8–27)
BUN/CREAT SERPL: 17 (ref 12–28)
CALCIUM SERPL-MCNC: 9.6 MG/DL (ref 8.7–10.3)
CASTS URNS QL MICRO: NORMAL /LPF
CHLORIDE SERPL-SCNC: 102 MMOL/L (ref 96–106)
CHOLEST SERPL-MCNC: 219 MG/DL (ref 100–199)
CHOLEST/HDLC SERPL: 2.8 RATIO (ref 0–4.4)
CO2 SERPL-SCNC: 24 MMOL/L (ref 20–29)
COLOR UR: YELLOW
CREAT SERPL-MCNC: 1.2 MG/DL (ref 0.57–1)
EGFRCR SERPLBLD CKD-EPI 2021: 44 ML/MIN/1.73
EOSINOPHIL # BLD AUTO: 0.5 X10E3/UL (ref 0–0.4)
EOSINOPHIL NFR BLD AUTO: 6 %
EPI CELLS #/AREA URNS HPF: NORMAL /HPF (ref 0–10)
ERYTHROCYTE [DISTWIDTH] IN BLOOD BY AUTOMATED COUNT: 15.1 % (ref 11.7–15.4)
GLOBULIN SER CALC-MCNC: 3.2 G/DL (ref 1.5–4.5)
GLUCOSE SERPL-MCNC: 114 MG/DL (ref 70–99)
GLUCOSE UR QL STRIP: NEGATIVE
HCT VFR BLD AUTO: 39.9 % (ref 34–46.6)
HDLC SERPL-MCNC: 77 MG/DL
HGB BLD-MCNC: 12.5 G/DL (ref 11.1–15.9)
HGB UR QL STRIP: NEGATIVE
IMM GRANULOCYTES # BLD AUTO: 0 X10E3/UL (ref 0–0.1)
IMM GRANULOCYTES NFR BLD AUTO: 0 %
KETONES UR QL STRIP: NEGATIVE
LDLC SERPL CALC-MCNC: 124 MG/DL (ref 0–99)
LEUKOCYTE ESTERASE UR QL STRIP: ABNORMAL
LYMPHOCYTES # BLD AUTO: 1.3 X10E3/UL (ref 0.7–3.1)
LYMPHOCYTES NFR BLD AUTO: 16 %
Lab: NORMAL
Lab: NORMAL
MCH RBC QN AUTO: 27.2 PG (ref 26.6–33)
MCHC RBC AUTO-ENTMCNC: 31.3 G/DL (ref 31.5–35.7)
MCV RBC AUTO: 87 FL (ref 79–97)
MICRO URNS: ABNORMAL
MONOCYTES # BLD AUTO: 0.7 X10E3/UL (ref 0.1–0.9)
MONOCYTES NFR BLD AUTO: 8 %
NEUTROPHILS # BLD AUTO: 5.5 X10E3/UL (ref 1.4–7)
NEUTROPHILS NFR BLD AUTO: 69 %
NITRITE UR QL STRIP: NEGATIVE
PH UR STRIP: 5.5 [PH] (ref 5–7.5)
PLATELET # BLD AUTO: 237 X10E3/UL (ref 150–450)
POTASSIUM SERPL-SCNC: 4.7 MMOL/L (ref 3.5–5.2)
PROT SERPL-MCNC: 7.1 G/DL (ref 6–8.5)
PROT UR QL STRIP: NEGATIVE
RBC # BLD AUTO: 4.6 X10E6/UL (ref 3.77–5.28)
RBC #/AREA URNS HPF: NORMAL /HPF (ref 0–2)
SODIUM SERPL-SCNC: 140 MMOL/L (ref 134–144)
SP GR UR STRIP: 1.01 (ref 1–1.03)
TRIGL SERPL-MCNC: 102 MG/DL (ref 0–149)
TSH SERPL DL<=0.005 MIU/L-ACNC: 4.37 UIU/ML (ref 0.45–4.5)
UROBILINOGEN UR STRIP-MCNC: 0.2 MG/DL (ref 0.2–1)
VLDLC SERPL CALC-MCNC: 18 MG/DL (ref 5–40)
WBC # BLD AUTO: 8 X10E3/UL (ref 3.4–10.8)
WBC #/AREA URNS HPF: NORMAL /HPF (ref 0–5)

## 2024-02-01 LAB
HBA1C MFR BLD: 5.5 % (ref 4.8–5.6)
WRITTEN AUTHORIZATION: NORMAL

## 2024-02-09 ENCOUNTER — HOSPITAL ENCOUNTER (OUTPATIENT)
Dept: CARDIOLOGY | Facility: HOSPITAL | Age: 84
Discharge: HOME | End: 2024-02-09
Admitting: INTERNAL MEDICINE
Payer: MEDICARE

## 2024-02-09 DIAGNOSIS — M79.89 LEG SWELLING: ICD-10-CM

## 2024-02-09 LAB
BH CV LOW VAS RIGHT LESSER SAPH VESSEL: 1
BH CV LOWER VASCULAR LEFT COMMON FEMORAL AUGMENT: NORMAL
BH CV LOWER VASCULAR LEFT COMMON FEMORAL COMPETENT: NORMAL
BH CV LOWER VASCULAR LEFT COMMON FEMORAL COMPRESS: NORMAL
BH CV LOWER VASCULAR LEFT COMMON FEMORAL PHASIC: NORMAL
BH CV LOWER VASCULAR LEFT COMMON FEMORAL SPONT: NORMAL
BH CV LOWER VASCULAR LEFT DISTAL FEMORAL COMPRESS: NORMAL
BH CV LOWER VASCULAR LEFT GASTRONEMIUS COMPRESS: NORMAL
BH CV LOWER VASCULAR LEFT GREATER SAPH AK COMPRESS: NORMAL
BH CV LOWER VASCULAR LEFT GREATER SAPH BK COMPRESS: NORMAL
BH CV LOWER VASCULAR LEFT LESSER SAPH COMPRESS: NORMAL
BH CV LOWER VASCULAR LEFT MID FEMORAL AUGMENT: NORMAL
BH CV LOWER VASCULAR LEFT MID FEMORAL COMPETENT: NORMAL
BH CV LOWER VASCULAR LEFT MID FEMORAL COMPRESS: NORMAL
BH CV LOWER VASCULAR LEFT MID FEMORAL PHASIC: NORMAL
BH CV LOWER VASCULAR LEFT MID FEMORAL SPONT: NORMAL
BH CV LOWER VASCULAR LEFT PERONEAL COMPRESS: NORMAL
BH CV LOWER VASCULAR LEFT POPLITEAL AUGMENT: NORMAL
BH CV LOWER VASCULAR LEFT POPLITEAL COMPETENT: NORMAL
BH CV LOWER VASCULAR LEFT POPLITEAL COMPRESS: NORMAL
BH CV LOWER VASCULAR LEFT POPLITEAL PHASIC: NORMAL
BH CV LOWER VASCULAR LEFT POPLITEAL SPONT: NORMAL
BH CV LOWER VASCULAR LEFT POSTERIOR TIBIAL COMPRESS: NORMAL
BH CV LOWER VASCULAR LEFT PROFUNDA FEMORAL COMPRESS: NORMAL
BH CV LOWER VASCULAR LEFT PROXIMAL FEMORAL COMPRESS: NORMAL
BH CV LOWER VASCULAR LEFT SAPHENOFEMORAL JUNCTION COMPRESS: NORMAL
BH CV LOWER VASCULAR RIGHT COMMON FEMORAL AUGMENT: NORMAL
BH CV LOWER VASCULAR RIGHT COMMON FEMORAL COMPETENT: NORMAL
BH CV LOWER VASCULAR RIGHT COMMON FEMORAL COMPRESS: NORMAL
BH CV LOWER VASCULAR RIGHT COMMON FEMORAL PHASIC: NORMAL
BH CV LOWER VASCULAR RIGHT COMMON FEMORAL SPONT: NORMAL
BH CV LOWER VASCULAR RIGHT DISTAL FEMORAL COMPRESS: NORMAL
BH CV LOWER VASCULAR RIGHT GASTRONEMIUS COMPRESS: NORMAL
BH CV LOWER VASCULAR RIGHT GREATER SAPH AK COMPRESS: NORMAL
BH CV LOWER VASCULAR RIGHT GREATER SAPH BK COMPRESS: NORMAL
BH CV LOWER VASCULAR RIGHT LESSER SAPH COMPRESS: NORMAL
BH CV LOWER VASCULAR RIGHT LESSER SAPH THROMBUS: NORMAL
BH CV LOWER VASCULAR RIGHT MID FEMORAL AUGMENT: NORMAL
BH CV LOWER VASCULAR RIGHT MID FEMORAL COMPETENT: NORMAL
BH CV LOWER VASCULAR RIGHT MID FEMORAL COMPRESS: NORMAL
BH CV LOWER VASCULAR RIGHT MID FEMORAL PHASIC: NORMAL
BH CV LOWER VASCULAR RIGHT MID FEMORAL SPONT: NORMAL
BH CV LOWER VASCULAR RIGHT PERONEAL COMPRESS: NORMAL
BH CV LOWER VASCULAR RIGHT POPLITEAL AUGMENT: NORMAL
BH CV LOWER VASCULAR RIGHT POPLITEAL COMPETENT: NORMAL
BH CV LOWER VASCULAR RIGHT POPLITEAL COMPRESS: NORMAL
BH CV LOWER VASCULAR RIGHT POPLITEAL PHASIC: NORMAL
BH CV LOWER VASCULAR RIGHT POPLITEAL SPONT: NORMAL
BH CV LOWER VASCULAR RIGHT POSTERIOR TIBIAL COMPRESS: NORMAL
BH CV LOWER VASCULAR RIGHT PROFUNDA FEMORAL COMPRESS: NORMAL
BH CV LOWER VASCULAR RIGHT PROXIMAL FEMORAL COMPRESS: NORMAL
BH CV LOWER VASCULAR RIGHT SAPHENOFEMORAL JUNCTION COMPRESS: NORMAL

## 2024-02-09 PROCEDURE — 93970 EXTREMITY STUDY: CPT

## 2024-02-25 DIAGNOSIS — I10 UNCONTROLLED HYPERTENSION: ICD-10-CM

## 2024-02-26 RX ORDER — LOSARTAN POTASSIUM 50 MG/1
50 TABLET ORAL DAILY
Qty: 30 TABLET | Refills: 0 | Status: SHIPPED | OUTPATIENT
Start: 2024-02-26

## 2024-02-27 ENCOUNTER — OFFICE VISIT (OUTPATIENT)
Dept: FAMILY MEDICINE CLINIC | Facility: CLINIC | Age: 84
End: 2024-02-27
Payer: MEDICARE

## 2024-02-27 VITALS
OXYGEN SATURATION: 95 % | WEIGHT: 214.2 LBS | RESPIRATION RATE: 14 BRPM | DIASTOLIC BLOOD PRESSURE: 56 MMHG | HEIGHT: 65 IN | BODY MASS INDEX: 35.69 KG/M2 | SYSTOLIC BLOOD PRESSURE: 108 MMHG | HEART RATE: 58 BPM | TEMPERATURE: 97.6 F

## 2024-02-27 DIAGNOSIS — R60.9 DEPENDENT EDEMA: ICD-10-CM

## 2024-02-27 DIAGNOSIS — E78.5 HYPERLIPIDEMIA, UNSPECIFIED HYPERLIPIDEMIA TYPE: ICD-10-CM

## 2024-02-27 DIAGNOSIS — N28.9 ABNORMAL KIDNEY FUNCTION: Primary | ICD-10-CM

## 2024-02-27 DIAGNOSIS — I10 PRIMARY HYPERTENSION: ICD-10-CM

## 2024-02-27 DIAGNOSIS — I51.89 DIASTOLIC DYSFUNCTION: ICD-10-CM

## 2024-02-27 NOTE — PROGRESS NOTES
"Chief Complaint  Follow-up    Subjective        Destiny Hawkins presents to [facility]  History of Present Illness  For follow-up on hypertension and the leg swelling  Patient stated she has done labs also last month and would like to have a follow-up on the labs.  Stated she is feeling better after starting on blood pressure pill.  Review of system is negative for fever, headache, chest pain, shortness of breath, palpitation, nausea, vomiting, any recent change in bladder habits.    Objective   Vital Signs:  /56 (BP Location: Left arm, Patient Position: Sitting, Cuff Size: Large Adult)   Pulse 58   Temp 97.6 °F (36.4 °C) (Temporal)   Resp 14   Ht 165 cm (64.96\")   Wt 97.2 kg (214 lb 3.2 oz)   SpO2 95%   BMI 35.69 kg/m²   Estimated body mass index is 35.69 kg/m² as calculated from the following:    Height as of this encounter: 165 cm (64.96\").    Weight as of this encounter: 97.2 kg (214 lb 3.2 oz).       Class 2 Severe Obesity (BMI >=35 and <=39.9). Obesity-related health conditions include the following: hypertension. Obesity is newly identified. BMI is is above average; BMI management plan is completed. We discussed portion control and increasing exercise.      Physical Exam  HENT:      Head: Normocephalic and atraumatic.      Mouth/Throat:      Mouth: Mucous membranes are moist.      Pharynx: Oropharynx is clear.   Eyes:      Extraocular Movements: Extraocular movements intact.      Conjunctiva/sclera: Conjunctivae normal.      Pupils: Pupils are equal, round, and reactive to light.   Cardiovascular:      Rate and Rhythm: Normal rate and regular rhythm.   Pulmonary:      Effort: Pulmonary effort is normal.      Breath sounds: Normal breath sounds.   Abdominal:      General: Bowel sounds are normal.      Palpations: Abdomen is soft.   Musculoskeletal:      Cervical back: Neck supple.      Right lower leg: Edema present.      Left lower leg: Edema present.   Skin:     " General: Skin is warm.      Capillary Refill: Capillary refill takes less than 2 seconds.   Neurological:      General: No focal deficit present.      Mental Status: She is alert and oriented to person, place, and time. Mental status is at baseline.   Psychiatric:         Mood and Affect: Mood normal.        Result Review :                     Assessment and Plan     Diagnoses and all orders for this visit:    1. Abnormal kidney function (Primary)  -     Basic Metabolic Panel; Future  -     Lipid Panel With / Chol / HDL Ratio; Future    2. Hyperlipidemia, unspecified hyperlipidemia type  -     Lipid Panel With / Chol / HDL Ratio; Future    3. Dependent edema    4. Diastolic dysfunction    5. Primary hypertension    # Hypertension  Controlled with losartan 50 mg p.o. daily, continue same    # Dependent edema  On Lasix 20 mg twice a day  Compression stocking prescription given to the patient    # Hyperlipidemia  Not on any medication, advise lifestyle modification which include diet and exercise  More fruits and vegetable in the diet.  # Decline in kidney function  Advised patient to add on 1 glass of water to her current regimen and avoid NSAIDs  Will repeat labs in 6 months    RTC in 6 months for repeat of kidney function and wellness checkup       Follow Up     No follow-ups on file.  Patient was given instructions and counseling regarding her condition or for health maintenance advice. Please see specific information pulled into the AVS if appropriate.

## 2024-04-26 ENCOUNTER — TELEPHONE (OUTPATIENT)
Dept: FAMILY MEDICINE CLINIC | Facility: CLINIC | Age: 84
End: 2024-04-26

## 2024-04-26 DIAGNOSIS — J45.20 MILD INTERMITTENT ASTHMA WITHOUT COMPLICATION: Primary | ICD-10-CM

## 2024-04-26 RX ORDER — FLUTICASONE PROPIONATE AND SALMETEROL 100; 50 UG/1; UG/1
1 POWDER RESPIRATORY (INHALATION)
Qty: 60 EACH | Refills: 6 | Status: SHIPPED | OUTPATIENT
Start: 2024-04-26

## 2024-04-26 NOTE — TELEPHONE ENCOUNTER
Caller: LIZ KWKO    Relationship: Emergency Contact    Best call back number: [phone]     What medication are you requesting: ADVAIR    If a prescription is needed, what is your preferred pharmacy and phone number: [facility] [id number] - [place], [place] - [address] AT [address] [facility] [address] - [phone]  - [phone]     Additional notes: PATIENTS SON STATED THE PATIENT WAS PREVIOUSLY PRESCRIBED THIS MEDICATION BY HER PRIMARY CARE IN [place], DR SARA GOODMAN.    PATIENTS SON STATED THE PATIENT WAS PRESCRIBED THIS MEDICATION FOR ASTHMA.    PATIENT PATIENTS SON STATED THE PATIENT IS OUT OF THIS MEDICATION.    PLEASE CALL PATIENTS SON TO INFORM WHEN THIS IS COMPLETED.

## 2024-04-26 NOTE — TELEPHONE ENCOUNTER
See message; I phoned son Morris & states she is completely out of her ADVAIR and if you can go ahead and send in a rx.

## 2024-04-26 NOTE — TELEPHONE ENCOUNTER
Thank you, I talked to son since it was documented that she had anaphylaxis to prednisone. Son reported pt is using advair INH 1 puff twice a day ( prescribed by previous PCP) and she had no issues with that so far. Prescription sent to [facility] pharmacy

## 2024-09-04 ENCOUNTER — OFFICE VISIT (OUTPATIENT)
Dept: FAMILY MEDICINE CLINIC | Facility: CLINIC | Age: 84
End: 2024-09-04
Payer: MEDICARE

## 2024-09-04 VITALS
TEMPERATURE: 98.2 F | RESPIRATION RATE: 15 BRPM | DIASTOLIC BLOOD PRESSURE: 64 MMHG | OXYGEN SATURATION: 96 % | HEART RATE: 81 BPM | SYSTOLIC BLOOD PRESSURE: 132 MMHG | HEIGHT: 65 IN | WEIGHT: 217.5 LBS | BODY MASS INDEX: 36.24 KG/M2

## 2024-09-04 DIAGNOSIS — N28.9 ABNORMAL KIDNEY FUNCTION: Primary | ICD-10-CM

## 2024-09-04 DIAGNOSIS — R60.9 DEPENDENT EDEMA: ICD-10-CM

## 2024-09-04 DIAGNOSIS — I51.89 DIASTOLIC DYSFUNCTION: ICD-10-CM

## 2024-09-04 DIAGNOSIS — E78.5 HYPERLIPIDEMIA, UNSPECIFIED HYPERLIPIDEMIA TYPE: ICD-10-CM

## 2024-09-04 DIAGNOSIS — R73.09 ABNORMAL GLUCOSE: ICD-10-CM

## 2024-09-04 DIAGNOSIS — E03.9 HYPOTHYROIDISM, UNSPECIFIED TYPE: ICD-10-CM

## 2024-09-04 DIAGNOSIS — I10 PRIMARY HYPERTENSION: ICD-10-CM

## 2024-09-04 DIAGNOSIS — J45.20 MILD INTERMITTENT ASTHMA WITHOUT COMPLICATION: ICD-10-CM

## 2024-09-04 PROCEDURE — G0439 PPPS, SUBSEQ VISIT: HCPCS | Performed by: STUDENT IN AN ORGANIZED HEALTH CARE EDUCATION/TRAINING PROGRAM

## 2024-09-04 PROCEDURE — 1159F MED LIST DOCD IN RCRD: CPT | Performed by: STUDENT IN AN ORGANIZED HEALTH CARE EDUCATION/TRAINING PROGRAM

## 2024-09-04 PROCEDURE — 1160F RVW MEDS BY RX/DR IN RCRD: CPT | Performed by: STUDENT IN AN ORGANIZED HEALTH CARE EDUCATION/TRAINING PROGRAM

## 2024-09-04 PROCEDURE — 1125F AMNT PAIN NOTED PAIN PRSNT: CPT | Performed by: STUDENT IN AN ORGANIZED HEALTH CARE EDUCATION/TRAINING PROGRAM

## 2024-09-04 NOTE — PROGRESS NOTES
Subjective   The ABCs of the Annual Wellness Visit  Medicare Wellness Visit      Destiny Hawkins is a 84 y.o. patient who presents for a Medicare Wellness Visit.    The following portions of the patient's history were reviewed and   updated as appropriate: allergies, current medications, past family history, past medical history, past social history, past surgical history, and problem list.    Compared to one year ago, the patient's physical   health is the same.  Compared to one year ago, the patient's mental   health is the same.    Recent Hospitalizations:  She was not admitted to the hospital during the last year.     Current Medical Providers:  Patient Care Team:  Angel Yi MD as PCP - General (Internal Medicine)    Outpatient Medications Prior to Visit   Medication Sig Dispense Refill    albuterol sulfate  (90 Base) MCG/ACT inhaler Inhale 2 puffs Every 4 (Four) Hours As Needed for Wheezing.      apixaban (ELIQUIS) 5 MG tablet tablet Take 1 tablet by mouth 2 (Two) Times a Day.      celecoxib (CeleBREX) 200 MG capsule Take 1 capsule by mouth Daily.      clotrimazole (LOTRIMIN) 1 % cream Apply 1 application topically to the appropriate area as directed 2 (Two) Times a Day. 14 g 3    difluprednate (DUREZOL) 0.05 % ophthalmic emulsion       Fluticasone-Salmeterol (ADVAIR/WIXELA) 100-50 MCG/ACT DISKUS Inhale 1 puff 2 (Two) Times a Day. 60 each 6    furosemide (Lasix) 20 MG tablet Take 1 tablet by mouth 2 (Two) Times a Day. 60 tablet 2    ketorolac (ACULAR) 0.5 % ophthalmic solution Administer 1 drop to the right eye 4 (Four) Times a Day.      levothyroxine (SYNTHROID, LEVOTHROID) 75 MCG tablet Take 1 tablet by mouth Daily.      losartan (COZAAR) 50 MG tablet TAKE 1 TABLET BY MOUTH DAILY 30 tablet 0    meclizine (ANTIVERT) 12.5 MG tablet Take 1 tablet by mouth 3 (Three) Times a Day As Needed for Dizziness.       No facility-administered medications prior to visit.     No opioid medication identified on  "active medication list. I have reviewed chart for other potential  high risk medication/s and harmful drug interactions in the elderly.      Aspirin is not on active medication list.  Aspirin use is not indicated based on review of current medical condition/s. Risk of harm outweighs potential benefits.  .    Patient Active Problem List   Diagnosis    Syncope, unspecified syncope type    Leg swelling    Uncontrolled hypertension    Diastolic dysfunction     Advance Care Planning Advance Directive is on file.  ACP discussion was held with the patient during this visit. Patient has an advance directive in EMR which is still valid.             Objective   Vitals:    24 1323   Pulse: 81   Resp: 15   Temp: 98.2 °F (36.8 °C)   TempSrc: Oral   SpO2: 96%   Weight: 98.7 kg (217 lb 8 oz)   Height: 165.1 cm (65\")   PainSc:   4   PainLoc: Head       Estimated body mass index is 36.19 kg/m² as calculated from the following:    Height as of this encounter: 165.1 cm (65\").    Weight as of this encounter: 98.7 kg (217 lb 8 oz).            Does the patient have evidence of cognitive impairment? Yes  Lab Results   Component Value Date    CHLPL 212 (H) 2024    TRIG 73 2024    HDL 72 2024     (H) 2024    VLDL 13 2024                                                                                                Health  Risk Assessment    Smoking Status:  Social History     Tobacco Use   Smoking Status Never    Passive exposure: Never   Smokeless Tobacco Never     Alcohol Consumption:  Social History     Substance and Sexual Activity   Alcohol Use Yes    Alcohol/week: 1.0 - 2.0 standard drink of alcohol    Types: 1 - 2 Glasses of wine per week    Comment: Occasional       Fall Risk Screen  STEADI Fall Risk Assessment was completed, and patient is at MODERATE risk for falls. Assessment completed on:2024    Depression Screenin/4/2024     1:25 PM   PHQ-2/PHQ-9 Depression Screening "   Little Interest or Pleasure in Doing Things 1-->several days   Feeling Down, Depressed or Hopeless 0-->not at all   PHQ-9: Brief Depression Severity Measure Score 1     Health Habits and Functional and Cognitive Screenin/4/2024     1:25 PM   Functional & Cognitive Status   Do you have difficulty preparing food and eating? No   Do you have difficulty bathing yourself, getting dressed or grooming yourself? Yes   Do you have difficulty using the toilet? No   Do you have difficulty moving around from place to place? Yes   Do you have trouble with steps or getting out of a bed or a chair? Yes   Current Diet Unhealthy Diet   Dental Exam Not up to date   Eye Exam Up to date   Exercise (times per week) Other   Current Exercises Include Home Exercise Program (TV, Computer, Etc.);Walking   Do you need help using the phone?  Yes   Are you deaf or do you have serious difficulty hearing?  Yes   Do you need help to go to places out of walking distance? Yes   Do you need help shopping? Yes   Do you need help preparing meals?  Yes   Do you need help with housework?  Yes   Do you need help with laundry? Yes   Do you need help taking your medications? Yes   Do you need help managing money? Yes   Do you ever drive or ride in a car without wearing a seat belt? No   Have you felt unusual stress, anger or loneliness in the last month? Yes   Who do you live with? Community   If you need help, do you have trouble finding someone available to you? No   Have you been bothered in the last four weeks by sexual problems? No   Do you have difficulty concentrating, remembering or making decisions? Yes           Age-appropriate Screening Schedule:  Refer to the list below for future screening recommendations based on patient's age, sex and/or medical conditions. Orders for these recommended tests are listed in the plan section. The patient has been provided with a written plan.    Health Maintenance List  Health Maintenance   Topic Date  "Due    DXA SCAN  Never done    Pneumococcal Vaccine 65+ (1 of 2 - PCV) Never done    TDAP/TD VACCINES (1 - Tdap) Never done    ZOSTER VACCINE (1 of 2) Never done    RSV Vaccine - Adults (1 - 1-dose 60+ series) Never done    ANNUAL WELLNESS VISIT  Never done    COVID-19 Vaccine (1 - 2023-24 season) Never done    INFLUENZA VACCINE  08/01/2024    BMI FOLLOWUP  02/27/2025    LIPID PANEL  08/28/2025                                                                                                                                                CMS Preventative Services Quick Reference  Risk Factors Identified During Encounter  Immunizations Discussed/Encouraged: Influenza, Prevnar 20 (Pneumococcal 20-valent conjugate), Shingrix, COVID19, and RSV (Respiratory Syncytial Virus)    The above risks/problems have been discussed with the patient.  Pertinent information has been shared with the patient in the After Visit Summary.  An After Visit Summary and PPPS were made available to the patient.    Follow Up:   Next Medicare Wellness visit to be scheduled in 1 year.         Additional E&M Note during same encounter follows:  Patient has additional, significant, and separately identifiable condition(s)/problem(s) that require work above and beyond the Medicare Wellness Visit     Chief Complaint  Medicare Wellness-subsequent (SON; LIZ STERLING.), Hearing Problem (USES HEARING BILATERAL./), and Headache    Subjective   HPI                  Objective   Vital Signs:  Pulse 81   Temp 98.2 °F (36.8 °C) (Oral)   Resp 15   Ht 165.1 cm (65\")   Wt 98.7 kg (217 lb 8 oz)   SpO2 96%   BMI 36.19 kg/m²   Physical Exam            Assessment and Plan               Abnormal kidney function    Hyperlipidemia, unspecified hyperlipidemia type     Dependent edema    Diastolic dysfunction    Mild intermittent asthma without complication          Primary hypertension    Hypothyroidism, unspecified type    Abnormal glucose    No orders of the defined " types were placed in this encounter.  # Hypertension  Patient is on losartan 50 mg p.o. daily  # Dependent edema  On diuretics twice daily along with compression stocking  # Hyperlipidemia not on any medication, managing with diet and exercise  # Declining kidney function in the last visit   # Diastolic dysfunction  On diuretics  # Hypothyroidism  On levothyroxine  # Pulmonary embolism  Patient is on Eliquis and patient was advised to be on this for the rest of her life   # Patient accompanied with his son and he mentioned she might not be taking her medication as prescribed because sometimes she forgets to take medication.  Currently she is not enrolled in a program which is available at her facility where someone can help her in administrating medication.  Son informed that they are deciding on it.  RTC in 6 months with repeat labs    Patient encouraged to partake of healthy diet rich in fresh fruits and vegetables as well as lean proteins.  Patient encouraged to participate in daily exercise with goal of 30 min sustained activity.  Wear seatbelt when driving  Flu shot annually      Follow Up   No follow-ups on file.  Patient was given instructions and counseling regarding her condition or for health maintenance advice. Please see specific information pulled into the AVS if appropriate.

## 2024-09-13 ENCOUNTER — OFFICE VISIT (OUTPATIENT)
Dept: FAMILY MEDICINE CLINIC | Facility: CLINIC | Age: 84
End: 2024-09-13
Payer: MEDICARE

## 2024-09-13 VITALS
TEMPERATURE: 97.6 F | HEIGHT: 65 IN | HEART RATE: 82 BPM | BODY MASS INDEX: 36.06 KG/M2 | SYSTOLIC BLOOD PRESSURE: 132 MMHG | WEIGHT: 216.4 LBS | OXYGEN SATURATION: 96 % | DIASTOLIC BLOOD PRESSURE: 76 MMHG | RESPIRATION RATE: 16 BRPM

## 2024-09-13 DIAGNOSIS — R42 DIZZINESS: ICD-10-CM

## 2024-09-13 DIAGNOSIS — L03.211 CELLULITIS OF FACE: Primary | ICD-10-CM

## 2024-09-13 PROCEDURE — 1125F AMNT PAIN NOTED PAIN PRSNT: CPT | Performed by: STUDENT IN AN ORGANIZED HEALTH CARE EDUCATION/TRAINING PROGRAM

## 2024-09-13 PROCEDURE — 99213 OFFICE O/P EST LOW 20 MIN: CPT | Performed by: STUDENT IN AN ORGANIZED HEALTH CARE EDUCATION/TRAINING PROGRAM

## 2024-09-13 RX ORDER — DOXYCYCLINE 100 MG/1
100 CAPSULE ORAL 2 TIMES DAILY
Qty: 20 CAPSULE | Refills: 0 | Status: SHIPPED | OUTPATIENT
Start: 2024-09-13

## 2024-09-13 RX ORDER — MECLIZINE HCL 12.5 MG 12.5 MG/1
12.5 TABLET ORAL 3 TIMES DAILY PRN
Qty: 30 TABLET | Refills: 1 | Status: SHIPPED | OUTPATIENT
Start: 2024-09-13

## 2024-09-13 RX ORDER — DOXYCYCLINE 100 MG/1
100 CAPSULE ORAL 2 TIMES DAILY
Qty: 20 CAPSULE | Refills: 0 | Status: SHIPPED | OUTPATIENT
Start: 2024-09-13 | End: 2024-09-13

## 2024-09-13 NOTE — PROGRESS NOTES
Office Note     Name: Destiny Hawkins    : 1940     MRN: [id number]     Chief Complaint  Numbness (Rt sided facial swelling ), Nausea, and Dizziness    Subjective     History of Present Illness:  Destiny Hawkins is a 84 y.o. female who presents today for evaluation of sided facial swelling in addition to chronic issue of periodic motion sickness/dizziness.      Past Medical History:   Past Medical History:   Diagnosis Date    Allergic     Arthritis     Asthma     Cataract     Diverticulosis     GERD (gastroesophageal reflux disease)     History of medical problems     HL (hearing loss)     Obesity     Renal insufficiency     Urinary tract infection        Past Surgical History:   Past Surgical History:   Procedure Laterality Date    EYE SURGERY      JOINT REPLACEMENT         Immunizations:   There is no immunization history on file for this patient.     Medications:     Current Outpatient Medications:     albuterol sulfate  (90 Base) MCG/ACT inhaler, Inhale 2 puffs Every 4 (Four) Hours As Needed for Wheezing., Disp: , Rfl:     apixaban (ELIQUIS) 5 MG tablet tablet, Take 1 tablet by mouth 2 (Two) Times a Day., Disp: , Rfl:     celecoxib (CeleBREX) 200 MG capsule, Take 1 capsule by mouth Daily., Disp: , Rfl:     clotrimazole (LOTRIMIN) 1 % cream, Apply 1 application topically to the appropriate area as directed 2 (Two) Times a Day., Disp: 14 g, Rfl: 3    difluprednate (DUREZOL) 0.05 % ophthalmic emulsion, , Disp: , Rfl:     doxycycline (VIBRAMYCIN) 100 MG capsule, Take 1 capsule by mouth 2 (Two) Times a Day., Disp: 20 capsule, Rfl: 0    Fluticasone-Salmeterol (ADVAIR/WIXELA) 100-50 MCG/ACT DISKUS, Inhale 1 puff 2 (Two) Times a Day., Disp: 60 each, Rfl: 6    furosemide (Lasix) 20 MG tablet, Take 1 tablet by mouth 2 (Two) Times a Day., Disp: 60 tablet, Rfl: 2    ketorolac (ACULAR) 0.5 % ophthalmic solution, Administer 1 drop to the right eye 4 (Four) Times a Day., Disp: , Rfl:     levothyroxine (SYNTHROID,  "LEVOTHROID) 75 MCG tablet, Take 1 tablet by mouth Daily., Disp: , Rfl:     losartan (COZAAR) 50 MG tablet, TAKE 1 TABLET BY MOUTH DAILY, Disp: 30 tablet, Rfl: 0    meclizine (ANTIVERT) 12.5 MG tablet, Take 1 tablet by mouth 3 (Three) Times a Day As Needed for Dizziness., Disp: 30 tablet, Rfl: 1    Allergies:   Allergies   Allergen Reactions    Gadolinium Derivatives (Mr Contrast) Anaphylaxis    Prednisone Anaphylaxis    Gabexate Mental Status Change    Allopurinol Unknown - High Severity    Cephalexin Unknown - Low Severity    Hydrocodone Unknown - Low Severity    Levaquin [Levofloxacin] Nausea And Vomiting    Penicillins Unknown - Low Severity    Sulfa Antibiotics Nausea And Vomiting    Tetanus Toxoid Unknown - Low Severity       Family History: History reviewed. No pertinent family history.    Social History:   Social History     Socioeconomic History    Marital status:    Tobacco Use    Smoking status: Never     Passive exposure: Never    Smokeless tobacco: Never   Vaping Use    Vaping status: Never Used   Substance and Sexual Activity    Alcohol use: Yes     Alcohol/week: 1.0 - 2.0 standard drink of alcohol     Types: 1 - 2 Glasses of wine per week     Comment: Occasional    Drug use: Never    Sexual activity: Not Currently     Partners: Male     Comment: Protected by age         Objective     Vital Signs  /76   Pulse 82   Temp 97.6 °F (36.4 °C) (Temporal)   Resp 16   Ht 165.1 cm (65\")   Wt 98.2 kg (216 lb 6.4 oz)   SpO2 96%   BMI 36.01 kg/m²   Estimated body mass index is 36.01 kg/m² as calculated from the following:    Height as of this encounter: 165.1 cm (65\").    Weight as of this encounter: 98.2 kg (216 lb 6.4 oz).            Physical Exam  HENT:      Head:      Comments: Erythema and tenderness to palpation of soft tissue overlying right zygomatic and infraorbital regions     Right Ear: Tympanic membrane normal. Tympanic membrane is not erythematous or bulging.      Left Ear: Tympanic " membrane normal. Tympanic membrane is not erythematous or bulging.      Ears:      Comments: Mild erythema of right external canal         Assessment and Plan     Diagnoses and all orders for this visit:    1. Cellulitis of face (Primary)    2. Dizziness    Other orders  -     Discontinue: doxycycline (VIBRAMYCIN) 100 MG capsule; Take 1 capsule by mouth 2 (Two) Times a Day.  Dispense: 20 capsule; Refill: 0  -     meclizine (ANTIVERT) 12.5 MG tablet; Take 1 tablet by mouth 3 (Three) Times a Day As Needed for Dizziness.  Dispense: 30 tablet; Refill: 1  -     doxycycline (VIBRAMYCIN) 100 MG capsule; Take 1 capsule by mouth 2 (Two) Times a Day.  Dispense: 20 capsule; Refill: 0         #Cellulitis of face  -Ms. Hawkins is an 84-year-old female who presents with 1 day history of right-sided facial swelling, pain, and erythema   -The patient presents with her son today who states that the patient had noted significant itching in her right ear canal at which time a retained hearing aid was removed from inside the canal  -The patient endorses nausea, but denies vomiting, fever, or chills  -The patient is afebrile, hemodynamically stable, and does not have any focal neurologic deficits on exam  -There is significant erythema and tenderness of soft tissue overlying the right zygomatic and infraorbital regions  -Mild erythema also noted in patient's right ear canal with clear TMs bilaterally  PLAN  -Will prescribe 10-day course of doxycycline 100 mg twice daily  -Advised the patient to seek further care should swelling/erythema worsen or should patient develop systemic symptoms despite antibiotic therapy    #Dizziness/motion sickness  - patient previously prescribed as needed meclizine for motion sickness/dizziness.  Patient's sons states that the patient rarely uses this but has recently run out and is requesting a renewal.  PLAN  -Renewed patient's previous prescription for meclizine    Follow Up  Return if symptoms worsen or  fail to improve.            MD RAMONA [facility]  [address] [address] [address] [phone]

## 2024-09-24 ENCOUNTER — APPOINTMENT (OUTPATIENT)
Dept: CT IMAGING | Facility: HOSPITAL | Age: 84
End: 2024-09-24
Payer: MEDICARE

## 2024-09-24 ENCOUNTER — OFFICE VISIT (OUTPATIENT)
Dept: FAMILY MEDICINE CLINIC | Facility: CLINIC | Age: 84
End: 2024-09-24
Payer: MEDICARE

## 2024-09-24 ENCOUNTER — HOSPITAL ENCOUNTER (EMERGENCY)
Facility: HOSPITAL | Age: 84
Discharge: HOME | End: 2024-09-24
Attending: EMERGENCY MEDICINE | Admitting: EMERGENCY MEDICINE
Payer: MEDICARE

## 2024-09-24 VITALS
OXYGEN SATURATION: 98 % | TEMPERATURE: 98.4 F | RESPIRATION RATE: 16 BRPM | BODY MASS INDEX: 34.78 KG/M2 | WEIGHT: 208.78 LBS | SYSTOLIC BLOOD PRESSURE: 160 MMHG | DIASTOLIC BLOOD PRESSURE: 101 MMHG | HEIGHT: 65 IN | HEART RATE: 61 BPM

## 2024-09-24 VITALS
TEMPERATURE: 98 F | OXYGEN SATURATION: 98 % | RESPIRATION RATE: 16 BRPM | HEIGHT: 65 IN | WEIGHT: 208.7 LBS | BODY MASS INDEX: 34.77 KG/M2 | HEART RATE: 73 BPM

## 2024-09-24 DIAGNOSIS — R11.0 NAUSEA: Primary | ICD-10-CM

## 2024-09-24 DIAGNOSIS — H92.01 MASTOID PAIN, RIGHT: ICD-10-CM

## 2024-09-24 DIAGNOSIS — R11.2 NAUSEA AND VOMITING, UNSPECIFIED VOMITING TYPE: ICD-10-CM

## 2024-09-24 DIAGNOSIS — R10.13 DYSPEPSIA: ICD-10-CM

## 2024-09-24 DIAGNOSIS — L03.211 CELLULITIS OF FACE: Primary | ICD-10-CM

## 2024-09-24 DIAGNOSIS — H92.01 RIGHT EAR PAIN: ICD-10-CM

## 2024-09-24 LAB
ALBUMIN SERPL-MCNC: 3.8 G/DL (ref 3.5–5.2)
ALBUMIN/GLOB SERPL: 1.2 G/DL
ALP SERPL-CCNC: 97 U/L (ref 39–117)
ALT SERPL W P-5'-P-CCNC: 8 U/L (ref 1–33)
ANION GAP SERPL CALCULATED.3IONS-SCNC: 7.2 MMOL/L (ref 5–15)
AST SERPL-CCNC: 12 U/L (ref 1–32)
BASOPHILS # BLD AUTO: 0.02 10*3/MM3 (ref 0–0.2)
BASOPHILS NFR BLD AUTO: 0.3 % (ref 0–1.5)
BILIRUB SERPL-MCNC: 0.7 MG/DL (ref 0–1.2)
BUN SERPL-MCNC: 25 MG/DL (ref 8–23)
BUN/CREAT SERPL: 20.7 (ref 7–25)
CALCIUM SPEC-SCNC: 10.3 MG/DL (ref 8.6–10.5)
CHLORIDE SERPL-SCNC: 103 MMOL/L (ref 98–107)
CO2 SERPL-SCNC: 29.8 MMOL/L (ref 22–29)
CREAT SERPL-MCNC: 1.21 MG/DL (ref 0.57–1)
DEPRECATED RDW RBC AUTO: 47.3 FL (ref 37–54)
EGFRCR SERPLBLD CKD-EPI 2021: 43.7 ML/MIN/1.73
EOSINOPHIL # BLD AUTO: 0.16 10*3/MM3 (ref 0–0.4)
EOSINOPHIL NFR BLD AUTO: 2.8 % (ref 0.3–6.2)
ERYTHROCYTE [DISTWIDTH] IN BLOOD BY AUTOMATED COUNT: 14.6 % (ref 12.3–15.4)
GLOBULIN UR ELPH-MCNC: 3.3 GM/DL
GLUCOSE SERPL-MCNC: 93 MG/DL (ref 65–99)
HCT VFR BLD AUTO: 40.5 % (ref 34–46.6)
HGB BLD-MCNC: 12.5 G/DL (ref 12–15.9)
IMM GRANULOCYTES # BLD AUTO: 0.01 10*3/MM3 (ref 0–0.05)
IMM GRANULOCYTES NFR BLD AUTO: 0.2 % (ref 0–0.5)
LIPASE SERPL-CCNC: 13 U/L (ref 13–60)
LYMPHOCYTES # BLD AUTO: 1.28 10*3/MM3 (ref 0.7–3.1)
LYMPHOCYTES NFR BLD AUTO: 22 % (ref 19.6–45.3)
MCH RBC QN AUTO: 27.1 PG (ref 26.6–33)
MCHC RBC AUTO-ENTMCNC: 30.9 G/DL (ref 31.5–35.7)
MCV RBC AUTO: 87.9 FL (ref 79–97)
MONOCYTES # BLD AUTO: 0.64 10*3/MM3 (ref 0.1–0.9)
MONOCYTES NFR BLD AUTO: 11 % (ref 5–12)
NEUTROPHILS NFR BLD AUTO: 3.7 10*3/MM3 (ref 1.7–7)
NEUTROPHILS NFR BLD AUTO: 63.7 % (ref 42.7–76)
PLATELET # BLD AUTO: 279 10*3/MM3 (ref 140–450)
PMV BLD AUTO: 10 FL (ref 6–12)
POTASSIUM SERPL-SCNC: 4.3 MMOL/L (ref 3.5–5.2)
PROT SERPL-MCNC: 7.1 G/DL (ref 6–8.5)
RBC # BLD AUTO: 4.61 10*6/MM3 (ref 3.77–5.28)
SODIUM SERPL-SCNC: 140 MMOL/L (ref 136–145)
WBC NRBC COR # BLD AUTO: 5.81 10*3/MM3 (ref 3.4–10.8)

## 2024-09-24 PROCEDURE — 70486 CT MAXILLOFACIAL W/O DYE: CPT

## 2024-09-24 PROCEDURE — 85025 COMPLETE CBC W/AUTO DIFF WBC: CPT | Performed by: NURSE PRACTITIONER

## 2024-09-24 PROCEDURE — 80053 COMPREHEN METABOLIC PANEL: CPT | Performed by: NURSE PRACTITIONER

## 2024-09-24 PROCEDURE — 70450 CT HEAD/BRAIN W/O DYE: CPT

## 2024-09-24 PROCEDURE — 96374 THER/PROPH/DIAG INJ IV PUSH: CPT

## 2024-09-24 PROCEDURE — 25810000003 SODIUM CHLORIDE 0.9 % SOLUTION: Performed by: NURSE PRACTITIONER

## 2024-09-24 PROCEDURE — 25010000002 ONDANSETRON PER 1 MG: Performed by: NURSE PRACTITIONER

## 2024-09-24 PROCEDURE — 99284 EMERGENCY DEPT VISIT MOD MDM: CPT

## 2024-09-24 PROCEDURE — 96375 TX/PRO/DX INJ NEW DRUG ADDON: CPT

## 2024-09-24 PROCEDURE — 74176 CT ABD & PELVIS W/O CONTRAST: CPT

## 2024-09-24 PROCEDURE — 83690 ASSAY OF LIPASE: CPT | Performed by: NURSE PRACTITIONER

## 2024-09-24 PROCEDURE — 99284 EMERGENCY DEPT VISIT MOD MDM: CPT | Performed by: NURSE PRACTITIONER

## 2024-09-24 RX ORDER — ONDANSETRON 2 MG/ML
4 INJECTION INTRAMUSCULAR; INTRAVENOUS ONCE
Status: COMPLETED | OUTPATIENT
Start: 2024-09-24 | End: 2024-09-24

## 2024-09-24 RX ORDER — FAMOTIDINE 10 MG/ML
20 INJECTION, SOLUTION INTRAVENOUS ONCE
Status: COMPLETED | OUTPATIENT
Start: 2024-09-24 | End: 2024-09-24

## 2024-09-24 RX ORDER — SODIUM CHLORIDE 0.9 % (FLUSH) 0.9 %
10 SYRINGE (ML) INJECTION AS NEEDED
Status: DISCONTINUED | OUTPATIENT
Start: 2024-09-24 | End: 2024-09-24 | Stop reason: HOSPADM

## 2024-09-24 RX ADMIN — SODIUM CHLORIDE 500 ML: 9 INJECTION, SOLUTION INTRAVENOUS at 15:51

## 2024-09-24 RX ADMIN — ONDANSETRON 4 MG: 2 INJECTION, SOLUTION INTRAMUSCULAR; INTRAVENOUS at 15:51

## 2024-09-24 RX ADMIN — FAMOTIDINE 20 MG: 10 INJECTION INTRAVENOUS at 15:51

## 2024-09-24 NOTE — DISCHARGE INSTRUCTIONS
Need to really fine tune the home medications and get Destiny some assistance with taking medications at prescribed time    For the right ear/back of head pain please follow up with Dr. Hilton, Ear, Nose and Throat MD    Return Precautions    Although you are being discharged from the ED today, I encourage you to return for worsening symptoms.  Things can, and do, change such that treatment at home with medication may not be adequate.      Specifically, return for any of the following:    Chest pain, shortness of breath, pain or nausea and vomiting not controlled by medications provided.    Please make a follow up with your Primary Care Provider for a blood pressure recheck.

## 2024-09-24 NOTE — FS[facility] PROVIDER NOTE
EMERGENCY DEPARTMENT ENCOUNTER    Room Number:  06/06  Date seen:  9/24/2024  Time seen: 15:02 EDT  PCP: Angel Yi MD  Historian: Patient, son    Discussed/obtained information from independent historians: Not applicable    HPI:  Chief complaint: Dizziness, nausea  A complete HPI/ROS/PMH/PSH/SH/FH are unobtainable due to: Not applicable  Context:Destiny Hawkins is a 84 y.o. female with some history of hypertension, diastolic dysfunction, GERD and recent facial cellulitis who presents to the ED with c/o constant dizziness and nausea, dyspepsia and right sided facial pain that been ongoing for at least 10 days. She has h/o hysterectomy, cholecystectomy, appendectomy and known hiatal hernia.  Was prescribed doxycycline but compliance has been an issue. She has been taking meclizine without improvement.  No vomiting or diarrhea.     External (non-ED) record review: I reviewed PCP visit September 13 of this year where she was diagnosed with cellulitis of the face and dizziness and discussed to take doxycycline and Antivert.  At that day she had significant erythema and tenderness over the right zygomatic and infraorbital region.  Today she had a primary care visit where she was seen today for acute abdominal pain there is mention of a hiatal hernia, possible umbilical hernia and she has tried Gas-X for symptoms.  Patient had tenderness to the mastoid process which caused her to have severe gagging reaction    Chronic or social conditions impacting care: polypharmacy, pt does not take medications at prescribed times.     ALLERGIES  Gadolinium derivatives (mr contrast), Prednisone, Gabexate, Allopurinol, Cephalexin, Hydrocodone, Levaquin [levofloxacin], Penicillins, Sulfa antibiotics, and Tetanus toxoid    PAST MEDICAL HISTORY  Active Ambulatory Problems     Diagnosis Date Noted    Syncope, unspecified syncope type 04/04/2023    Leg swelling 01/30/2024    Uncontrolled hypertension 01/30/2024    Diastolic  dysfunction 01/30/2024     Resolved Ambulatory Problems     Diagnosis Date Noted    No Resolved Ambulatory Problems     Past Medical History:   Diagnosis Date    Allergic     Arthritis     Asthma     Cataract     Diverticulosis     GERD (gastroesophageal reflux disease)     History of medical problems     HL (hearing loss)     Obesity     Renal insufficiency     Urinary tract infection        PAST SURGICAL HISTORY  Past Surgical History:   Procedure Laterality Date    EYE SURGERY      JOINT REPLACEMENT         FAMILY HISTORY  History reviewed. No pertinent family history.    SOCIAL HISTORY  Social History     Socioeconomic History    Marital status:    Tobacco Use    Smoking status: Never     Passive exposure: Never    Smokeless tobacco: Never   Vaping Use    Vaping status: Never Used   Substance and Sexual Activity    Alcohol use: Yes     Alcohol/week: 1.0 - 2.0 standard drink of alcohol     Types: 1 - 2 Glasses of wine per week     Comment: Occasional    Drug use: Never    Sexual activity: Not Currently     Partners: Male     Comment: Protected by age       REVIEW OF SYSTEMS  Review of Systems    All systems reviewed and negative except for those discussed in HPI.     PHYSICAL EXAM    I have reviewed the triage vital signs and nursing notes.  Vitals:    09/24/24 1525   BP: (!) 160/101   Pulse: 61   Resp: 16   Temp: 98.4 °F (36.9 °C)   SpO2: 98%     Physical Exam    GENERAL: not distressed  HENT: nares patent, mm moist, no facial droop. Voice normal.  Left and Right TM's normal.  NO facial swelling or mastoid swelling.  There is mild preauricular (right) tenderness and right posterior ear tenderness, no focal mastoid tenderness.  Small lump/mass to back of head.   EYES: no scleral icterus, PERRL, EOMI, no nystagmus  NECK: no ROM limitations  CV: regular rhythm, regular rate, no murmur  RESPIRATORY: normal effort, CTAB  ABDOMEN: soft, generalized tenderness.  No focal tenderness  :  deferred  MUSCULOSKELETAL: no deformity, BLANC=, no weakness or drifting  NEURO: alert, moves all extremities, follows commands  SKIN: warm, dry    LAB RESULTS  Recent Results (from the past 24 hour(s))   Comprehensive Metabolic Panel    Collection Time: 09/24/24  3:35 PM    Specimen: Blood   Result Value Ref Range    Glucose 93 65 - 99 mg/dL    BUN 25 (H) 8 - 23 mg/dL    Creatinine 1.21 (H) 0.57 - 1.00 mg/dL    Sodium 140 136 - 145 mmol/L    Potassium 4.3 3.5 - 5.2 mmol/L    Chloride 103 98 - 107 mmol/L    CO2 29.8 (H) 22.0 - 29.0 mmol/L    Calcium 10.3 8.6 - 10.5 mg/dL    Total Protein 7.1 6.0 - 8.5 g/dL    Albumin 3.8 3.5 - 5.2 g/dL    ALT (SGPT) 8 1 - 33 U/L    AST (SGOT) 12 1 - 32 U/L    Alkaline Phosphatase 97 39 - 117 U/L    Total Bilirubin 0.7 0.0 - 1.2 mg/dL    Globulin 3.3 gm/dL    A/G Ratio 1.2 g/dL    BUN/Creatinine Ratio 20.7 7.0 - 25.0    Anion Gap 7.2 5.0 - 15.0 mmol/L    eGFR 43.7 (L) >60.0 mL/min/1.73   Lipase    Collection Time: 09/24/24  3:35 PM    Specimen: Blood   Result Value Ref Range    Lipase 13 13 - 60 U/L   CBC Auto Differential    Collection Time: 09/24/24  3:35 PM    Specimen: Blood   Result Value Ref Range    WBC 5.81 3.40 - 10.80 10*3/mm3    RBC 4.61 3.77 - 5.28 10*6/mm3    Hemoglobin 12.5 12.0 - 15.9 g/dL    Hematocrit 40.5 34.0 - 46.6 %    MCV 87.9 79.0 - 97.0 fL    MCH 27.1 26.6 - 33.0 pg    MCHC 30.9 (L) 31.5 - 35.7 g/dL    RDW 14.6 12.3 - 15.4 %    RDW-SD 47.3 37.0 - 54.0 fl    MPV 10.0 6.0 - 12.0 fL    Platelets 279 140 - 450 10*3/mm3    Neutrophil % 63.7 42.7 - 76.0 %    Lymphocyte % 22.0 19.6 - 45.3 %    Monocyte % 11.0 5.0 - 12.0 %    Eosinophil % 2.8 0.3 - 6.2 %    Basophil % 0.3 0.0 - 1.5 %    Immature Grans % 0.2 0.0 - 0.5 %    Neutrophils, Absolute 3.70 1.70 - 7.00 10*3/mm3    Lymphocytes, Absolute 1.28 0.70 - 3.10 10*3/mm3    Monocytes, Absolute 0.64 0.10 - 0.90 10*3/mm3    Eosinophils, Absolute 0.16 0.00 - 0.40 10*3/mm3    Basophils, Absolute 0.02 0.00 - 0.20 10*3/mm3     Immature Grans, Absolute 0.01 0.00 - 0.05 10*3/mm3       Ordered the above labs and independently interpreted results.  My findings will be discussed in the ED course or medical decision making section below    RADIOLOGY RESULTS  CT Abdomen Pelvis Without Contrast    Result Date: 9/24/2024  CT ABDOMEN PELVIS WO CONTRAST-  INDICATION: Generalized abdominal pain, nausea, belching  COMPARISON: CT abdomen pelvis February 20, 2023  TECHNIQUE: Routine CT abdomen and pelvis without IV contrast. Coronal and sagittal reformats. Radiation dose reduction techniques were utilized, including automated exposure control and exposure modulation based on body size.  FINDINGS:  Lung bases: Calcified pulmonary nodule in the posterior basilar right lower lobe, consistent with prior granulomatous infection. Nodular opacity in the posterior basilar left lower lobe, extending to the pleura, series 3, axial mage 26, measures 2.3 cm, unchanged in the interval, possible round atelectasis. Mild cardiomegaly. Lipomatous hypertrophy of the interatrial septum.  ABDOMEN: Normal liver. Cholecystectomy. No biliary ductal dilatation. Spleen is normal in size. Severe pancreatic lipomatosis. No pancreatic ductal dilatation or mass seen. No adrenal nodules. Bilateral hyperattenuating renal medullary pyramids. No hydronephrosis. No ureterolithiasis.  Pelvis: Underdistended bladder. No bladder calculus. Hysterectomy. No adnexal mass.  Bowel: Small hiatal hernia or epiphrenic diverticulum. No obstruction. Colonic diverticulosis. Appendix not identified though no secondary findings of appendicitis. Small bowel containing umbilical hernia, without small bowel obstruction.  Abdominal wall: Rectus diastases. Pelvic wall scarring.  Retroperitoneum: No lymphadenopathy.  Vasculature: Moderate to severe aortoiliac atherosclerotic calcification.  Osseous structures: Bilateral hip osteoarthritis. Bilateral L5 pars defects with grade 1 anterolisthesis of L5 on S1.  Lumbar spondylosis/degenerative disc disease and facet degenerative arthropathy.       1. No acute findings identified in the abdomen or pelvis. 2. Colonic diverticulosis. 3. Small bowel containing umbilical hernia, without small bowel obstruction. 4. Medullary nephrocalcinosis. No hydronephrosis or ureterolithiasis.  This report was finalized on 9/24/2024 4:57 PM by Dr. Jose Mcfarland M.D on Workstation: [id number]      CT Head Without Contrast, CT Facial Bones Without Contrast    Result Date: 9/24/2024  EMERGENCY CT SCAN OF THE HEAD AND FACIAL BONES WITHOUT CONTRAST ON 09/24/2024  CLINICAL HISTORY: This is an 84-year-old female patient who has headaches and right-sided preauricular facial pain and right mastoid tenderness and the patient reports some dizziness  HEAD CT TECHNIQUE:  Spiral CT images were obtained from the base of the skull to the vertex without intravenous contrast. The images were reformatted and are submitted in 3 mm thick axial, sagittal and coronal CT sections with brain algorithm.  COMPARISON: There are no prior head CTs for comparison.  FINDINGS: There is some mild patchy low-density in the periventricular white matter consistent with mild small vessel disease. The remainder of the brain parenchyma is normal in attenuation. The ventricles are normal in size. I see no focal mass effect. There is no midline shift. No extra axial fluid collections are identified. There is no evidence of acute intracranial hemorrhage. The paranasal sinuses and the mastoid air cells and the middle ear cavities are clear. There are bilateral intraocular lens implants in place in the globes from previous bilateral cataract surgery. Otherwise the orbits are unremarkable.      1. No acute intracranial abnormality is identified.  2. There is mild small vessel disease in the cerebral white matter and there are calcified plaques in the intracranial segment of the distal left vertebral artery and cavernous segments of  the internal carotid arteries bilaterally. There are bilateral intraocular lens implants in the globes from previous bilateral cataract surgery. The remainder of the head CT is normal. The etiology of the patient's dizziness and headaches is not established on this exam.  FACIAL CT TECHNIQUE:  Spiral CT images were obtained through the facial bones in the axial imaging plane without intravenous contrast. The images were reformatted and are submitted in 2 mm thick axial, sagittal and coronal CT sections with soft tissue algorithm and 1 mm thick axial, sagittal and coronal CT sections with high-resolution bone algorithm.  FINDINGS: There are bilateral intraocular lens implants in the globes from previous bilateral cataract surgery. Otherwise the orbits are unremarkable. The paranasal sinuses and the mastoid air cells and the middle ear cavities are clear. The nasopharynx, oropharynx and the hypopharynx are normal in appearance. The parotid and submandibular glands are symmetric and normal in appearance. The fat anterior to the right ear in the right preauricular region is clean.  IMPRESSION: 1. Negative CT scan of the facial bones. The etiology of the patient's right facial and preauricular pain is not established on this exam.  Radiation dose reduction techniques were utilized, including automated exposure control and exposure modulation based on body size.         Ordered the above noted radiological studies.  Independently interpreted by me.  My findings will be discussed in the medical decision section below.     PROGRESS, DATA ANALYSIS, CONSULTS AND MEDICAL DECISION MAKING    Please note that this section constitutes my independent interpretation of clinical data including lab results, radiology, EKG's.  This constitutes my independent professional opinion regarding differential diagnosis and management of this patient.  It may include any factors such as history from outside sources, review of external records,  social determinants of health, management of medications, response to those treatments, and discussions with other providers.    ED Course as of 09/24/24 1722   Tue Sep 24, 2024   1622 Discussed pt with Dr. Bundy, Radiologist. No acute findings.  [EW]      ED Course User Index  [EW] Elen Bryant MarizolJULIA     Orders placed during this visit:  Orders Placed This Encounter   Procedures    CT Head Without Contrast    CT Facial Bones Without Contrast    CT Abdomen Pelvis Without Contrast    Comprehensive Metabolic Panel    Lipase    Urinalysis without microscopic (no culture) - Urine, Clean Catch    CBC Auto Differential    Blood Draw With IV Start    Insert peripheral IV    CBC & Differential    ED Acknowledgement Form Needed;            Medical Decision Making  Problems Addressed:  Dyspepsia: complicated acute illness or injury  Nausea: complicated acute illness or injury  Right ear pain: complicated acute illness or injury    Amount and/or Complexity of Data Reviewed  Labs: ordered.  Radiology: ordered.    Risk  Prescription drug management.    Patient presents with a multitude of complaints including right ear pain, concern for mastoiditis, chronic dizziness, nausea that is constant, dyspepsia and generalized abdominal pain.  Labs obtained and unremarkable.  Her HEENT exam is unremarkable even though she does have some pretty tenderness to palpation preauricular on the right postauricular on the right in the back of the head without redness or swelling.  The TM exam bilaterally is normal without any evidence of otitis.  There is no facial swelling or redness to suggest any facial cellulitis.  She did complete a course of Doxy though I am not sure she took medication appropriately all of the pills are gone from the bottle.  Her nausea and dyspepsia completely stopped with a dose of Pepcid and Zofran and CT of the abdomen and pelvis had no acute findings.  She is currently a resident of [facility] which is an assisted  living facility and I did discuss with her son that her care likely needs to be escalated especially with regard to medication management.  I do wonder if there are some cognitive impairments going on as well.  Patient and her son felt comfortable with her being discharged.  I did refer her to ear nose and throat.  I also considered this could be a little trigeminal neuralgia or temporal arteritis which can be followed up with ENT.  Lastly, I am not concerned for any acute stroke as her dizziness seems chronic at best and she has been seen multiple times recently for primary care provider for this.        DIAGNOSIS  Final diagnoses:   Nausea   Dyspepsia   Right ear pain          Medication List      No changes were made to your prescriptions during this visit.         FOLLOW-UP  Angel Yi MD  [address]  [facility] [id number] [phone]    Schedule an appointment as soon as possible for a visit in 3 days  As needed, If symptoms worsen    Stiven Hilton MD  [address] [id number] [phone]    Schedule an appointment as soon as possible for a visit           Latest Documented Vital Signs:  As of 17:22 EDT  BP- (!) 160/101 HR- 61 Temp- 98.4 °F (36.9 °C) (Oral) O2 sat- 98%    Appropriate PPE utilized throughout this patient encounter to include mask, if indicated, per current protocol. Hand hygiene was performed before donning PPE and after removal when leaving the room.    Please note that portions of this were completed with a voice recognition program.     Note Disclaimer: At [facility], we believe that sharing information builds trust and better relationships. You are receiving this note because you are receiving care at [facility] or recently visited. It is possible you will see health information before a provider has talked with you about it. This kind of information can be easy to misunderstand. To help you fully understand what it means for your health,  we urge you to discuss this note with your provider.

## 2024-11-05 ENCOUNTER — TELEPHONE (OUTPATIENT)
Dept: FAMILY MEDICINE CLINIC | Facility: CLINIC | Age: 84
End: 2024-11-05

## 2024-11-05 NOTE — TELEPHONE ENCOUNTER
[facility].  SPOKE WITH SON LIZ.    THAT FACILITY HAS TAKEN OVER HER MEDICATION, AND SHE HAS TO RING A DOORBELL & TALK TO THEM TO GIVE IT TO HER, AND SOMETIMES THEY MAY TAKE UP TO 15 MINUTES WAITING FOR IT, THE FACILITY HAS ASKED SON THAT THEY WILL NEED A DOCTOR'S ORDER FOR PT TO HAVE ON HAND MECLIZINE & THE ALBUTEROL.    CALL LIZ WHEN READY FOR .

## 2024-11-05 NOTE — TELEPHONE ENCOUNTER
Caller: LIZ KWOK    Relationship: Emergency Contact    Best call back number: [phone]    Who are you requesting to speak with (clinical staff, provider,  specific staff member): CLINICAL       What was the call regarding: SON ASKS TO SPEAK TO NURSE

## 2024-11-06 NOTE — TELEPHONE ENCOUNTER
LIZ CALLED BACK WANTING UPDATE. SPOKE WITH ADRIENNE AND ADRIENNE THOUGHT PT WAS AWARE THAT HE WOULD BE CONTACTED ONCE MEDS ARE READY FOR .    THANK YOU

## 2024-11-07 RX ORDER — ALBUTEROL SULFATE 90 UG/1
2 INHALANT RESPIRATORY (INHALATION) EVERY 4 HOURS PRN
Qty: 6.7 G | Refills: 3 | Status: SHIPPED | OUTPATIENT
Start: 2024-11-07

## 2024-11-07 RX ORDER — MECLIZINE HCL 12.5 MG 12.5 MG/1
12.5 TABLET ORAL 3 TIMES DAILY PRN
Qty: 30 TABLET | Refills: 1 | Status: SHIPPED | OUTPATIENT
Start: 2024-11-07

## 2024-11-07 NOTE — TELEPHONE ENCOUNTER
Son called back upset that rxs were sent to pharmacy. He needs a written note to give to facility. Please call son back to find out exactly what needs to be written and to inform him when note can be picked up.    Message in chart 11/5/24 is about a written note and son is wanting to be contacted regarding.

## 2024-11-11 ENCOUNTER — TELEPHONE (OUTPATIENT)
Dept: FAMILY MEDICINE CLINIC | Facility: CLINIC | Age: 84
End: 2024-11-11
Payer: MEDICARE